# Patient Record
Sex: MALE | Race: OTHER | HISPANIC OR LATINO | URBAN - METROPOLITAN AREA
[De-identification: names, ages, dates, MRNs, and addresses within clinical notes are randomized per-mention and may not be internally consistent; named-entity substitution may affect disease eponyms.]

---

## 2017-12-07 ENCOUNTER — EMERGENCY (EMERGENCY)
Facility: HOSPITAL | Age: 27
LOS: 0 days | Discharge: HOME | End: 2017-12-08
Attending: EMERGENCY MEDICINE | Admitting: EMERGENCY MEDICINE
Payer: COMMERCIAL

## 2017-12-07 VITALS
DIASTOLIC BLOOD PRESSURE: 89 MMHG | WEIGHT: 160.06 LBS | OXYGEN SATURATION: 100 % | HEIGHT: 64 IN | SYSTOLIC BLOOD PRESSURE: 179 MMHG | TEMPERATURE: 98 F | HEART RATE: 110 BPM | RESPIRATION RATE: 18 BRPM

## 2017-12-07 DIAGNOSIS — Z79.899 OTHER LONG TERM (CURRENT) DRUG THERAPY: ICD-10-CM

## 2017-12-07 DIAGNOSIS — F31.9 BIPOLAR DISORDER, UNSPECIFIED: ICD-10-CM

## 2017-12-07 DIAGNOSIS — Z00.8 ENCOUNTER FOR OTHER GENERAL EXAMINATION: ICD-10-CM

## 2017-12-07 DIAGNOSIS — Z91.013 ALLERGY TO SEAFOOD: ICD-10-CM

## 2017-12-07 PROCEDURE — 99285 EMERGENCY DEPT VISIT HI MDM: CPT | Mod: 25

## 2017-12-07 RX ORDER — HALOPERIDOL DECANOATE 100 MG/ML
5 INJECTION INTRAMUSCULAR ONCE
Qty: 0 | Refills: 0 | Status: COMPLETED | OUTPATIENT
Start: 2017-12-07 | End: 2017-12-07

## 2017-12-07 RX ADMIN — Medication 2 MILLIGRAM(S): at 23:28

## 2017-12-07 RX ADMIN — HALOPERIDOL DECANOATE 5 MILLIGRAM(S): 100 INJECTION INTRAMUSCULAR at 23:25

## 2017-12-07 NOTE — ED PROVIDER NOTE - CARE PLAN
Principal Discharge DX:	Bipolar affective disorder, current episode mixed, current episode severity unspecified
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
## 2017-12-07 NOTE — ED PROVIDER NOTE - CONSTITUTIONAL, MLM
normal... Well appearing, well nourished, awake, alert, oriented to person, place.  Tearful, appears anxious, but is cooperative.
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
## 2017-12-07 NOTE — ED PROVIDER NOTE - PROGRESS NOTE DETAILS
JG:  Pt. was unable to sit still and had disorganized thinking.  Manic behavior and meds were given for his bipolar disorder. Sign out from Dr. Michel.  Psych eval pending.  Pt seen and cleared by psych.

## 2017-12-07 NOTE — ED PROVIDER NOTE - FAMILY HISTORY
Father  Still living? Yes, Estimated age: Age Unknown  Family history of bipolar disorder, Age at diagnosis: Age Unknown

## 2017-12-08 VITALS
DIASTOLIC BLOOD PRESSURE: 69 MMHG | RESPIRATION RATE: 16 BRPM | HEART RATE: 50 BPM | OXYGEN SATURATION: 100 % | SYSTOLIC BLOOD PRESSURE: 104 MMHG

## 2017-12-08 DIAGNOSIS — F31.61 BIPOLAR DISORDER, CURRENT EPISODE MIXED, MILD: ICD-10-CM

## 2017-12-08 DIAGNOSIS — F10.10 ALCOHOL ABUSE, UNCOMPLICATED: ICD-10-CM

## 2017-12-08 DIAGNOSIS — F12.10 CANNABIS ABUSE, UNCOMPLICATED: ICD-10-CM

## 2017-12-08 LAB
ALBUMIN SERPL ELPH-MCNC: 3.7 G/DL — SIGNIFICANT CHANGE UP (ref 3.3–5)
ALP SERPL-CCNC: 54 U/L — SIGNIFICANT CHANGE UP (ref 40–120)
ALT FLD-CCNC: 28 U/L — SIGNIFICANT CHANGE UP (ref 12–78)
AMPHET UR-MCNC: NEGATIVE — SIGNIFICANT CHANGE UP
ANION GAP SERPL CALC-SCNC: 7 MMOL/L — SIGNIFICANT CHANGE UP (ref 5–17)
APAP SERPL-MCNC: < 2 UG/ML (ref 10–30)
APPEARANCE UR: CLEAR — SIGNIFICANT CHANGE UP
AST SERPL-CCNC: 24 U/L — SIGNIFICANT CHANGE UP (ref 15–37)
BARBITURATES UR SCN-MCNC: NEGATIVE — SIGNIFICANT CHANGE UP
BASOPHILS # BLD AUTO: 0.1 K/UL — SIGNIFICANT CHANGE UP (ref 0–0.2)
BASOPHILS NFR BLD AUTO: 1.1 % — SIGNIFICANT CHANGE UP (ref 0–2)
BENZODIAZ UR-MCNC: NEGATIVE — SIGNIFICANT CHANGE UP
BILIRUB SERPL-MCNC: 0.8 MG/DL — SIGNIFICANT CHANGE UP (ref 0.2–1.2)
BILIRUB UR-MCNC: NEGATIVE — SIGNIFICANT CHANGE UP
BUN SERPL-MCNC: 16 MG/DL — SIGNIFICANT CHANGE UP (ref 7–23)
CALCIUM SERPL-MCNC: 8.9 MG/DL — SIGNIFICANT CHANGE UP (ref 8.5–10.1)
CHLORIDE SERPL-SCNC: 107 MMOL/L — SIGNIFICANT CHANGE UP (ref 96–108)
CO2 SERPL-SCNC: 27 MMOL/L — SIGNIFICANT CHANGE UP (ref 22–31)
COCAINE METAB.OTHER UR-MCNC: NEGATIVE — SIGNIFICANT CHANGE UP
COLOR SPEC: YELLOW — SIGNIFICANT CHANGE UP
CREAT SERPL-MCNC: 0.96 MG/DL — SIGNIFICANT CHANGE UP (ref 0.5–1.3)
DIFF PNL FLD: NEGATIVE — SIGNIFICANT CHANGE UP
EOSINOPHIL # BLD AUTO: 0.1 K/UL — SIGNIFICANT CHANGE UP (ref 0–0.5)
EOSINOPHIL NFR BLD AUTO: 1.5 % — SIGNIFICANT CHANGE UP (ref 0–6)
ETHANOL SERPL-MCNC: <10 MG/DL — SIGNIFICANT CHANGE UP (ref 0–10)
GLUCOSE SERPL-MCNC: 99 MG/DL — SIGNIFICANT CHANGE UP (ref 70–99)
GLUCOSE UR QL: NEGATIVE MG/DL — SIGNIFICANT CHANGE UP
HCT VFR BLD CALC: 38.6 % — LOW (ref 39–50)
HGB BLD-MCNC: 13.3 G/DL — SIGNIFICANT CHANGE UP (ref 13–17)
KETONES UR-MCNC: NEGATIVE — SIGNIFICANT CHANGE UP
LEUKOCYTE ESTERASE UR-ACNC: NEGATIVE — SIGNIFICANT CHANGE UP
LYMPHOCYTES # BLD AUTO: 2.7 K/UL — SIGNIFICANT CHANGE UP (ref 1–3.3)
LYMPHOCYTES # BLD AUTO: 28.5 % — SIGNIFICANT CHANGE UP (ref 13–44)
MCHC RBC-ENTMCNC: 30.1 PG — SIGNIFICANT CHANGE UP (ref 27–34)
MCHC RBC-ENTMCNC: 34.4 GM/DL — SIGNIFICANT CHANGE UP (ref 32–36)
MCV RBC AUTO: 87.6 FL — SIGNIFICANT CHANGE UP (ref 80–100)
METHADONE UR-MCNC: NEGATIVE — SIGNIFICANT CHANGE UP
MONOCYTES # BLD AUTO: 0.8 K/UL — SIGNIFICANT CHANGE UP (ref 0–0.9)
MONOCYTES NFR BLD AUTO: 8.6 % — SIGNIFICANT CHANGE UP (ref 2–14)
NEUTROPHILS # BLD AUTO: 5.8 K/UL — SIGNIFICANT CHANGE UP (ref 1.8–7.4)
NEUTROPHILS NFR BLD AUTO: 60.4 % — SIGNIFICANT CHANGE UP (ref 43–77)
NITRITE UR-MCNC: NEGATIVE — SIGNIFICANT CHANGE UP
OPIATES UR-MCNC: NEGATIVE — SIGNIFICANT CHANGE UP
PCP SPEC-MCNC: SIGNIFICANT CHANGE UP
PCP UR-MCNC: NEGATIVE — SIGNIFICANT CHANGE UP
PH UR: 6.5 — SIGNIFICANT CHANGE UP (ref 5–8)
PLATELET # BLD AUTO: 322 K/UL — SIGNIFICANT CHANGE UP (ref 150–400)
POTASSIUM SERPL-MCNC: 3.7 MMOL/L — SIGNIFICANT CHANGE UP (ref 3.5–5.3)
POTASSIUM SERPL-SCNC: 3.7 MMOL/L — SIGNIFICANT CHANGE UP (ref 3.5–5.3)
PROT SERPL-MCNC: 6.8 GM/DL — SIGNIFICANT CHANGE UP (ref 6–8.3)
PROT UR-MCNC: NEGATIVE MG/DL — SIGNIFICANT CHANGE UP
RBC # BLD: 4.41 M/UL — SIGNIFICANT CHANGE UP (ref 4.2–5.8)
RBC # FLD: 11.1 % — SIGNIFICANT CHANGE UP (ref 10.3–14.5)
SALICYLATES SERPL-MCNC: <1.7 MG/DL (ref 2.8–20)
SODIUM SERPL-SCNC: 141 MMOL/L — SIGNIFICANT CHANGE UP (ref 135–145)
SP GR SPEC: 1.01 — SIGNIFICANT CHANGE UP (ref 1.01–1.02)
THC UR QL: POSITIVE — SIGNIFICANT CHANGE UP
TSH SERPL-MCNC: 0.37 UIU/ML — SIGNIFICANT CHANGE UP (ref 0.36–3.74)
UROBILINOGEN FLD QL: NEGATIVE MG/DL — SIGNIFICANT CHANGE UP
WBC # BLD: 9.5 K/UL — SIGNIFICANT CHANGE UP (ref 3.8–10.5)
WBC # FLD AUTO: 9.5 K/UL — SIGNIFICANT CHANGE UP (ref 3.8–10.5)

## 2017-12-08 PROCEDURE — 93010 ELECTROCARDIOGRAM REPORT: CPT

## 2017-12-08 NOTE — ED BEHAVIORAL HEALTH ASSESSMENT NOTE - DESCRIPTION
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
## 2017-12-08 NOTE — ED BEHAVIORAL HEALTH ASSESSMENT NOTE - RISK ASSESSMENT
moderate for aggression due to poor insight, substance use, impusivity and disagreements with brother

## 2017-12-08 NOTE — ED BEHAVIORAL HEALTH ASSESSMENT NOTE - SUMMARY
28 yo man with mood symptoms for past 6-8 months apparent bipolar spectrum illness especially in light of family history likely stabilized by Adderall use and withdrawal.  Patient does not meet criteria for involuntary commitment at this time and refuses voluntary admit.    Agreed to referral to [facility] in light of hx of substance use. may also choose to return to [facility] where he was treated previously

## 2017-12-09 ENCOUNTER — EMERGENCY (EMERGENCY)
Facility: HOSPITAL | Age: 27
LOS: 0 days | Discharge: HOME | End: 2017-12-09
Attending: EMERGENCY MEDICINE | Admitting: EMERGENCY MEDICINE
Payer: COMMERCIAL

## 2017-12-09 VITALS
RESPIRATION RATE: 17 BRPM | HEART RATE: 61 BPM | OXYGEN SATURATION: 100 % | DIASTOLIC BLOOD PRESSURE: 92 MMHG | SYSTOLIC BLOOD PRESSURE: 140 MMHG | TEMPERATURE: 97 F

## 2017-12-09 VITALS — WEIGHT: 130.07 LBS | HEIGHT: 67 IN
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
## 2017-12-09 DIAGNOSIS — R68.84 JAW PAIN: ICD-10-CM

## 2017-12-09 DIAGNOSIS — Z79.899 OTHER LONG TERM (CURRENT) DRUG THERAPY: ICD-10-CM

## 2017-12-09 DIAGNOSIS — F10.10 ALCOHOL ABUSE, UNCOMPLICATED: ICD-10-CM
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
## 2017-12-09 DIAGNOSIS — F30.8 OTHER MANIC EPISODES: ICD-10-CM

## 2017-12-09 DIAGNOSIS — Z81.8 FAMILY HISTORY OF OTHER MENTAL AND BEHAVIORAL DISORDERS: ICD-10-CM

## 2017-12-09 DIAGNOSIS — S00.83XA CONTUSION OF OTHER PART OF HEAD, INITIAL ENCOUNTER: ICD-10-CM
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
## 2017-12-09 DIAGNOSIS — F31.0 BIPOLAR DISORDER, CURRENT EPISODE HYPOMANIC: ICD-10-CM

## 2017-12-09 DIAGNOSIS — F12.10 CANNABIS ABUSE, UNCOMPLICATED: ICD-10-CM

## 2017-12-09 LAB
APAP SERPL-MCNC: < 2 UG/ML — SIGNIFICANT CHANGE UP (ref 10–30)
ETHANOL SERPL-MCNC: <10 MG/DL — SIGNIFICANT CHANGE UP (ref 0–10)
SALICYLATES SERPL-MCNC: <1.7 MG/DL — LOW (ref 2.8–20)

## 2017-12-09 PROCEDURE — 90792 PSYCH DIAG EVAL W/MED SRVCS: CPT | Mod: GT

## 2017-12-09 PROCEDURE — 70450 CT HEAD/BRAIN W/O DYE: CPT | Mod: 26

## 2017-12-09 PROCEDURE — 70486 CT MAXILLOFACIAL W/O DYE: CPT | Mod: 26

## 2017-12-09 PROCEDURE — 99285 EMERGENCY DEPT VISIT HI MDM: CPT | Mod: 25

## 2017-12-09 PROCEDURE — 76377 3D RENDER W/INTRP POSTPROCES: CPT | Mod: 26

## 2017-12-09 RX ORDER — CYCLOBENZAPRINE HYDROCHLORIDE 10 MG/1
1 TABLET, FILM COATED ORAL
Qty: 16 | Refills: 0 | OUTPATIENT
Start: 2017-12-09 | End: 2017-12-13

## 2017-12-09 NOTE — ED BEHAVIORAL HEALTH ASSESSMENT NOTE - FAMILY HISTORY OF SUBSTANCE ABUSE
Infusion Nursing Note:  Izabella Og presents today for IVIG.    Patient seen by provider today: No   present during visit today: Not Applicable.    Note: Followed rate increase for [employer] per package insert. Pt request top rate 350 ml/hr.    Intravenous Access:  Peripheral IV placed.    Treatment Conditions:  Not Applicable.      Post Infusion Assessment:  Patient tolerated infusion without incident.  Site patent and intact, free from redness, edema or discomfort.  No evidence of extravasations.  Access discontinued per protocol.    Discharge Plan:   Patient and/or family verbalized understanding of discharge instructions and all questions answered.    Nasima Christensen RN                        
None known
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
## 2017-12-09 NOTE — ED PROVIDER NOTE - MUSCULOSKELETAL, MLM
Spine appears normal, range of motion is not limited, no muscle or joint tenderness.  Neck: NT, AFROM.  Back NT, stable.  Pelvis NT, stable.  COHEN x 4.  Normal gait.
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
## 2017-12-09 NOTE — ED PROVIDER NOTE - CHPI ED SYMPTOMS NEG
no blurred vision/no change in level of consciousness/no chest pain/no abrasion/no back pain/no chest wall tenderness/no loss of consciousness/no weakness/no seizure
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
## 2017-12-09 NOTE — ED BEHAVIORAL HEALTH ASSESSMENT NOTE - DESCRIPTION
27 year old SHM, domiciled with family, recently took a medical leave from work none Calm and cooperative, received valium 5 mg oral in ED, due to anxiety  Vital Signs Last 24 Hrs  T(C): 36.6 (09 Dec 2017 00:20), Max: 36.6 (09 Dec 2017 00:20)  T(F): 97.9 (09 Dec 2017 00:20), Max: 97.9 (09 Dec 2017 00:20)  HR: 74 (09 Dec 2017 00:20) (50 - 74)  BP: 144/90 (09 Dec 2017 00:20) (104/69 - 144/90)  BP(mean): --  RR: 19 (09 Dec 2017 00:20) (16 - 19)  SpO2: 100% (09 Dec 2017 00:20) (100% - 100%)
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
## 2017-12-09 NOTE — ED ADULT NURSE REASSESSMENT NOTE - NS ED NURSE REASSESS COMMENT FT1
Patient exerted bizarre behavior at ED. Restless, pacing, reporting "I am out of my mind", reports feeling anxious. Mother voiced concern over son's behavior stating he stopped taking his meds for Bipolar about 1 week ago. Mother reports his behavior and mentation has been decreasing over the last week. Patient layed on the floor for a few seconds. 1:1 for safety. Pending psych eval. Medicated for pain to jaw as ordered. Will continue monitoring patient.

## 2017-12-09 NOTE — ED PROVIDER NOTE - NEUROLOGICAL, MLM
Alert and oriented, no focal deficits, no motor or sensory deficits.  Normal speech.  CNs II - XII intact.

## 2017-12-09 NOTE — ED BEHAVIORAL HEALTH ASSESSMENT NOTE - SUICIDE PROTECTIVE FACTORS
Responsibility to family and others/Fear of death or dying due to pain/suffering/High spirituality/Supportive social network or family/Identifies reasons for living/Future oriented
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
## 2017-12-09 NOTE — ED BEHAVIORAL HEALTH ASSESSMENT NOTE - SAFETY PLAN DETAILS
patient and mother advised to return to ED or call 911 for any worsening symptoms and patient agreed.

## 2018-02-08 ENCOUNTER — APPOINTMENT (OUTPATIENT)
Dept: NEUROLOGY | Facility: CLINIC | Age: 28
End: 2018-02-08
Payer: COMMERCIAL

## 2018-02-08 VITALS
BODY MASS INDEX: 21.69 KG/M2 | DIASTOLIC BLOOD PRESSURE: 82 MMHG | WEIGHT: 135 LBS | HEART RATE: 40 BPM | SYSTOLIC BLOOD PRESSURE: 152 MMHG | HEIGHT: 66 IN

## 2018-02-08 DIAGNOSIS — Z86.59 PERSONAL HISTORY OF OTHER MENTAL AND BEHAVIORAL DISORDERS: ICD-10-CM

## 2018-02-08 DIAGNOSIS — Z78.9 OTHER SPECIFIED HEALTH STATUS: ICD-10-CM

## 2018-02-08 DIAGNOSIS — F41.9 ANXIETY DISORDER, UNSPECIFIED: ICD-10-CM

## 2018-02-08 PROCEDURE — 99213 OFFICE O/P EST LOW 20 MIN: CPT

## 2018-02-17 ENCOUNTER — EMERGENCY (EMERGENCY)
Facility: HOSPITAL | Age: 28
LOS: 0 days | Discharge: HOME | End: 2018-02-17
Attending: EMERGENCY MEDICINE | Admitting: EMERGENCY MEDICINE
Payer: COMMERCIAL

## 2018-02-17 VITALS
RESPIRATION RATE: 18 BRPM | OXYGEN SATURATION: 100 % | DIASTOLIC BLOOD PRESSURE: 108 MMHG | WEIGHT: 147.05 LBS | HEART RATE: 73 BPM | SYSTOLIC BLOOD PRESSURE: 142 MMHG | TEMPERATURE: 98 F

## 2018-02-17 VITALS
DIASTOLIC BLOOD PRESSURE: 78 MMHG | OXYGEN SATURATION: 100 % | HEART RATE: 68 BPM | SYSTOLIC BLOOD PRESSURE: 135 MMHG | RESPIRATION RATE: 17 BRPM | TEMPERATURE: 98 F

## 2018-02-17 DIAGNOSIS — F31.9 BIPOLAR DISORDER, UNSPECIFIED: ICD-10-CM

## 2018-02-17 DIAGNOSIS — R69 ILLNESS, UNSPECIFIED: ICD-10-CM

## 2018-02-17 LAB
AMPHET UR-MCNC: NEGATIVE — SIGNIFICANT CHANGE UP
ANION GAP SERPL CALC-SCNC: 5 MMOL/L — SIGNIFICANT CHANGE UP (ref 5–17)
APAP SERPL-MCNC: < 2 UG/ML (ref 10–30)
APPEARANCE UR: CLEAR — SIGNIFICANT CHANGE UP
BARBITURATES UR SCN-MCNC: NEGATIVE — SIGNIFICANT CHANGE UP
BASOPHILS # BLD AUTO: 0.1 K/UL — SIGNIFICANT CHANGE UP (ref 0–0.2)
BASOPHILS NFR BLD AUTO: 1 % — SIGNIFICANT CHANGE UP (ref 0–2)
BENZODIAZ UR-MCNC: NEGATIVE — SIGNIFICANT CHANGE UP
BILIRUB UR-MCNC: NEGATIVE — SIGNIFICANT CHANGE UP
BUN SERPL-MCNC: 9 MG/DL — SIGNIFICANT CHANGE UP (ref 7–23)
CALCIUM SERPL-MCNC: 8.9 MG/DL — SIGNIFICANT CHANGE UP (ref 8.5–10.1)
CHLORIDE SERPL-SCNC: 108 MMOL/L — SIGNIFICANT CHANGE UP (ref 96–108)
CO2 SERPL-SCNC: 28 MMOL/L — SIGNIFICANT CHANGE UP (ref 22–31)
COCAINE METAB.OTHER UR-MCNC: NEGATIVE — SIGNIFICANT CHANGE UP
COLOR SPEC: YELLOW — SIGNIFICANT CHANGE UP
CREAT SERPL-MCNC: 0.83 MG/DL — SIGNIFICANT CHANGE UP (ref 0.5–1.3)
DIFF PNL FLD: NEGATIVE — SIGNIFICANT CHANGE UP
EOSINOPHIL # BLD AUTO: 0.2 K/UL — SIGNIFICANT CHANGE UP (ref 0–0.5)
EOSINOPHIL NFR BLD AUTO: 1.6 % — SIGNIFICANT CHANGE UP (ref 0–6)
ETHANOL SERPL-MCNC: <10 MG/DL — SIGNIFICANT CHANGE UP (ref 0–10)
GLUCOSE SERPL-MCNC: 94 MG/DL — SIGNIFICANT CHANGE UP (ref 70–99)
GLUCOSE UR QL: NEGATIVE MG/DL — SIGNIFICANT CHANGE UP
HCT VFR BLD CALC: 42.8 % — SIGNIFICANT CHANGE UP (ref 39–50)
HGB BLD-MCNC: 14.5 G/DL — SIGNIFICANT CHANGE UP (ref 13–17)
KETONES UR-MCNC: NEGATIVE — SIGNIFICANT CHANGE UP
LEUKOCYTE ESTERASE UR-ACNC: NEGATIVE — SIGNIFICANT CHANGE UP
LYMPHOCYTES # BLD AUTO: 2.6 K/UL — SIGNIFICANT CHANGE UP (ref 1–3.3)
LYMPHOCYTES # BLD AUTO: 24.1 % — SIGNIFICANT CHANGE UP (ref 13–44)
MCHC RBC-ENTMCNC: 29.9 PG — SIGNIFICANT CHANGE UP (ref 27–34)
MCHC RBC-ENTMCNC: 33.8 GM/DL — SIGNIFICANT CHANGE UP (ref 32–36)
MCV RBC AUTO: 88.4 FL — SIGNIFICANT CHANGE UP (ref 80–100)
METHADONE UR-MCNC: NEGATIVE — SIGNIFICANT CHANGE UP
MONOCYTES # BLD AUTO: 0.6 K/UL — SIGNIFICANT CHANGE UP (ref 0–0.9)
MONOCYTES NFR BLD AUTO: 5.6 % — SIGNIFICANT CHANGE UP (ref 2–14)
NEUTROPHILS # BLD AUTO: 7.3 K/UL — SIGNIFICANT CHANGE UP (ref 1.8–7.4)
NEUTROPHILS NFR BLD AUTO: 67.8 % — SIGNIFICANT CHANGE UP (ref 43–77)
NITRITE UR-MCNC: NEGATIVE — SIGNIFICANT CHANGE UP
OPIATES UR-MCNC: NEGATIVE — SIGNIFICANT CHANGE UP
PCP SPEC-MCNC: SIGNIFICANT CHANGE UP
PCP UR-MCNC: NEGATIVE — SIGNIFICANT CHANGE UP
PH UR: 7 — SIGNIFICANT CHANGE UP (ref 5–8)
PLATELET # BLD AUTO: 291 K/UL — SIGNIFICANT CHANGE UP (ref 150–400)
POTASSIUM SERPL-MCNC: 4.2 MMOL/L — SIGNIFICANT CHANGE UP (ref 3.5–5.3)
POTASSIUM SERPL-SCNC: 4.2 MMOL/L — SIGNIFICANT CHANGE UP (ref 3.5–5.3)
PROT UR-MCNC: NEGATIVE MG/DL — SIGNIFICANT CHANGE UP
RBC # BLD: 4.84 M/UL — SIGNIFICANT CHANGE UP (ref 4.2–5.8)
RBC # FLD: 12 % — SIGNIFICANT CHANGE UP (ref 10.3–14.5)
SALICYLATES SERPL-MCNC: <1.7 MG/DL — LOW (ref 2.8–20)
SODIUM SERPL-SCNC: 141 MMOL/L — SIGNIFICANT CHANGE UP (ref 135–145)
SP GR SPEC: 1 — LOW (ref 1.01–1.02)
THC UR QL: POSITIVE — SIGNIFICANT CHANGE UP
TSH SERPL-MCNC: 1.6 UIU/ML — SIGNIFICANT CHANGE UP (ref 0.36–3.74)
UROBILINOGEN FLD QL: NEGATIVE MG/DL — SIGNIFICANT CHANGE UP
WBC # BLD: 10.8 K/UL — HIGH (ref 3.8–10.5)
WBC # FLD AUTO: 10.8 K/UL — HIGH (ref 3.8–10.5)

## 2018-02-17 PROCEDURE — 93010 ELECTROCARDIOGRAM REPORT: CPT

## 2018-02-17 PROCEDURE — 99284 EMERGENCY DEPT VISIT MOD MDM: CPT

## 2018-02-17 NOTE — ED PROVIDER NOTE - PLAN OF CARE
Continue to take your medications. Follow up with your primary doctor and with your psychiatrist. Return to the Emergency Dept if you develop any new or worsening symptoms
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
## 2018-02-17 NOTE — ED BEHAVIORAL HEALTH ASSESSMENT NOTE - SUMMARY
Patient is a 27 year old single male with a history of ADHD treatment and bipolar disorder.  Patient was in the ED in Dec. 2017 with similar episode of agitation. He uses THC daily. He admits to mild mood swings and anger outbursts, and continues to refuse medication.  He is in therapy and willing to increase it to 2x week.  He is under the care of Dr. Saenz for neurology issues, and is willing to consider lithium. Patient denies SI/HI, no delusions or hallucinations.

## 2018-02-17 NOTE — ED ADULT NURSE NOTE - OBJECTIVE STATEMENT
26 y/o M BIB PD from home for agitation. Pt denies SI, HI at this time but does state " I am nervous if I'm really in control anymore." As per pt, there was a recent diagnosis of bipolar by a therapist, denies being on any medication at this time. Pt states he was previously on Adderall but is no longer taking that medication. Pt states his father and brother have a hx of bipolar. Pt states that he is the oldest sibling and does not feel he is meeting the expectation of being the eldest. Pt states he feels his family is "tiptoeing around him." Pt states today he had a "break." Pt calm and cooperative during evaluation, 1:1 maintained for safety.

## 2018-02-17 NOTE — ED BEHAVIORAL HEALTH ASSESSMENT NOTE - DESCRIPTION
Patient was agitated on arrival but calmed down with support from the staff and his mother. none Single male lives with family, from [facility], was working 4 year for [employer]

## 2018-02-17 NOTE — ED ADULT NURSE NOTE - CHIEF COMPLAINT QUOTE
Pt brought in by ANIL ([id number]) after becoming agitated with family at home.  Pt denies SI, HI, but states, "I was sort of upset before and I didn't want to hurt my sister, instead I..."  Pt denies self-harm.  Cooperative at triage desk.  Recent DX bipolar.

## 2018-02-17 NOTE — ED PROVIDER NOTE - CARE PLAN
Principal Discharge DX:	Bipolar affective disorder, current episode manic, current episode severity unspecified  Assessment and plan of treatment:	Continue to take your medications. Follow up with your primary doctor and with your psychiatrist. Return to the Emergency Dept if you develop any new or worsening symptoms

## 2018-02-17 NOTE — ED PROVIDER NOTE - PROGRESS NOTE DETAILS
Pt signed out to me pending psychiatry eval. Pt seen, evaluated and cleared by psychiatry for outpatient follow up. Patient comfortable with discharge plan, understands return instructions.
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
## 2018-02-17 NOTE — ED ADULT TRIAGE NOTE - CHIEF COMPLAINT QUOTE
Pt brought in by ANIL ([id number]) after becoming agitated with family at home.  Pt denies SI, HI, but states, "I was sort of upset before and I didn't want to hurt my sister, instead I..."  Pt denies self-harm.  Cooperative at triage desk.  Recent DX bipolar.

## 2018-02-17 NOTE — ED BEHAVIORAL HEALTH ASSESSMENT NOTE - OTHER PAST PSYCHIATRIC HISTORY (INCLUDE DETAILS REGARDING ONSET, COURSE OF ILLNESS, INPATIENT/OUTPATIENT TREATMENT)
tried zoloft for a short while but stopped it, has a therapist 2 x week, ED visits here at , no admissions.

## 2018-03-15 ENCOUNTER — INPATIENT (INPATIENT)
Facility: HOSPITAL | Age: 28
LOS: 4 days | Discharge: HOME | End: 2018-03-20
Attending: PSYCHIATRY & NEUROLOGY | Admitting: PSYCHIATRY & NEUROLOGY
Payer: COMMERCIAL

## 2018-03-15 VITALS — WEIGHT: 134.92 LBS

## 2018-03-15 DIAGNOSIS — F39 UNSPECIFIED MOOD [AFFECTIVE] DISORDER: ICD-10-CM

## 2018-03-15 DIAGNOSIS — R69 ILLNESS, UNSPECIFIED: ICD-10-CM

## 2018-03-15 LAB
ALBUMIN SERPL ELPH-MCNC: 4 G/DL — SIGNIFICANT CHANGE UP (ref 3.3–5)
ALP SERPL-CCNC: 46 U/L — SIGNIFICANT CHANGE UP (ref 40–120)
ALT FLD-CCNC: 27 U/L — SIGNIFICANT CHANGE UP (ref 12–78)
AMPHET UR-MCNC: NEGATIVE — SIGNIFICANT CHANGE UP
ANION GAP SERPL CALC-SCNC: 4 MMOL/L — LOW (ref 5–17)
APAP SERPL-MCNC: < 2 UG/ML (ref 10–30)
APPEARANCE UR: CLEAR — SIGNIFICANT CHANGE UP
AST SERPL-CCNC: 25 U/L — SIGNIFICANT CHANGE UP (ref 15–37)
BARBITURATES UR SCN-MCNC: NEGATIVE — SIGNIFICANT CHANGE UP
BASOPHILS # BLD AUTO: 0.05 K/UL — SIGNIFICANT CHANGE UP (ref 0–0.2)
BASOPHILS NFR BLD AUTO: 0.4 % — SIGNIFICANT CHANGE UP (ref 0–2)
BENZODIAZ UR-MCNC: NEGATIVE — SIGNIFICANT CHANGE UP
BILIRUB SERPL-MCNC: 1.1 MG/DL — SIGNIFICANT CHANGE UP (ref 0.2–1.2)
BILIRUB UR-MCNC: NEGATIVE — SIGNIFICANT CHANGE UP
BUN SERPL-MCNC: 17 MG/DL — SIGNIFICANT CHANGE UP (ref 7–23)
CALCIUM SERPL-MCNC: 8.7 MG/DL — SIGNIFICANT CHANGE UP (ref 8.5–10.1)
CHLORIDE SERPL-SCNC: 108 MMOL/L — SIGNIFICANT CHANGE UP (ref 96–108)
CO2 SERPL-SCNC: 25 MMOL/L — SIGNIFICANT CHANGE UP (ref 22–31)
COCAINE METAB.OTHER UR-MCNC: NEGATIVE — SIGNIFICANT CHANGE UP
COLOR SPEC: YELLOW — SIGNIFICANT CHANGE UP
CREAT SERPL-MCNC: 1.1 MG/DL — SIGNIFICANT CHANGE UP (ref 0.5–1.3)
DIFF PNL FLD: NEGATIVE — SIGNIFICANT CHANGE UP
EOSINOPHIL # BLD AUTO: 0.02 K/UL — SIGNIFICANT CHANGE UP (ref 0–0.5)
EOSINOPHIL NFR BLD AUTO: 0.2 % — SIGNIFICANT CHANGE UP (ref 0–6)
ETHANOL SERPL-MCNC: <10 MG/DL — SIGNIFICANT CHANGE UP (ref 0–10)
GLUCOSE SERPL-MCNC: 113 MG/DL — HIGH (ref 70–99)
GLUCOSE UR QL: NEGATIVE MG/DL — SIGNIFICANT CHANGE UP
HCT VFR BLD CALC: 38.6 % — LOW (ref 39–50)
HGB BLD-MCNC: 13.7 G/DL — SIGNIFICANT CHANGE UP (ref 13–17)
IMM GRANULOCYTES NFR BLD AUTO: 0.4 % — SIGNIFICANT CHANGE UP (ref 0–1.5)
KETONES UR-MCNC: (no result)
LEUKOCYTE ESTERASE UR-ACNC: NEGATIVE — SIGNIFICANT CHANGE UP
LYMPHOCYTES # BLD AUTO: 16.3 % — SIGNIFICANT CHANGE UP (ref 13–44)
LYMPHOCYTES # BLD AUTO: 2.16 K/UL — SIGNIFICANT CHANGE UP (ref 1–3.3)
MCHC RBC-ENTMCNC: 30.6 PG — SIGNIFICANT CHANGE UP (ref 27–34)
MCHC RBC-ENTMCNC: 35.5 GM/DL — SIGNIFICANT CHANGE UP (ref 32–36)
MCV RBC AUTO: 86.2 FL — SIGNIFICANT CHANGE UP (ref 80–100)
METHADONE UR-MCNC: NEGATIVE — SIGNIFICANT CHANGE UP
MONOCYTES # BLD AUTO: 0.68 K/UL — SIGNIFICANT CHANGE UP (ref 0–0.9)
MONOCYTES NFR BLD AUTO: 5.1 % — SIGNIFICANT CHANGE UP (ref 2–14)
NEUTROPHILS # BLD AUTO: 10.31 K/UL — HIGH (ref 1.8–7.4)
NEUTROPHILS NFR BLD AUTO: 77.6 % — HIGH (ref 43–77)
NITRITE UR-MCNC: NEGATIVE — SIGNIFICANT CHANGE UP
NRBC # BLD: 0 /100 WBCS — SIGNIFICANT CHANGE UP (ref 0–0)
OPIATES UR-MCNC: NEGATIVE — SIGNIFICANT CHANGE UP
PCP SPEC-MCNC: SIGNIFICANT CHANGE UP
PCP UR-MCNC: NEGATIVE — SIGNIFICANT CHANGE UP
PH UR: 6.5 — SIGNIFICANT CHANGE UP (ref 5–8)
PLATELET # BLD AUTO: 285 K/UL — SIGNIFICANT CHANGE UP (ref 150–400)
POTASSIUM SERPL-MCNC: 3.6 MMOL/L — SIGNIFICANT CHANGE UP (ref 3.5–5.3)
POTASSIUM SERPL-SCNC: 3.6 MMOL/L — SIGNIFICANT CHANGE UP (ref 3.5–5.3)
PROT SERPL-MCNC: 7.1 GM/DL — SIGNIFICANT CHANGE UP (ref 6–8.3)
PROT UR-MCNC: NEGATIVE MG/DL — SIGNIFICANT CHANGE UP
RBC # BLD: 4.48 M/UL — SIGNIFICANT CHANGE UP (ref 4.2–5.8)
RBC # FLD: 12.4 % — SIGNIFICANT CHANGE UP (ref 10.3–14.5)
SALICYLATES SERPL-MCNC: <1.7 MG/DL — LOW (ref 2.8–20)
SODIUM SERPL-SCNC: 137 MMOL/L — SIGNIFICANT CHANGE UP (ref 135–145)
SP GR SPEC: 1.01 — SIGNIFICANT CHANGE UP (ref 1.01–1.02)
THC UR QL: POSITIVE — SIGNIFICANT CHANGE UP
UROBILINOGEN FLD QL: NEGATIVE MG/DL — SIGNIFICANT CHANGE UP
WBC # BLD: 13.27 K/UL — HIGH (ref 3.8–10.5)
WBC # FLD AUTO: 13.27 K/UL — HIGH (ref 3.8–10.5)

## 2018-03-15 PROCEDURE — 99285 EMERGENCY DEPT VISIT HI MDM: CPT

## 2018-03-15 PROCEDURE — 93010 ELECTROCARDIOGRAM REPORT: CPT

## 2018-03-15 RX ORDER — HALOPERIDOL DECANOATE 100 MG/ML
5 INJECTION INTRAMUSCULAR ONCE
Qty: 0 | Refills: 0 | Status: DISCONTINUED | OUTPATIENT
Start: 2018-03-15 | End: 2018-03-20

## 2018-03-15 RX ORDER — TRAZODONE HCL 50 MG
50 TABLET ORAL AT BEDTIME
Qty: 0 | Refills: 0 | Status: DISCONTINUED | OUTPATIENT
Start: 2018-03-15 | End: 2018-03-18

## 2018-03-15 RX ORDER — DIPHENHYDRAMINE HCL 50 MG
50 CAPSULE ORAL ONCE
Qty: 0 | Refills: 0 | Status: DISCONTINUED | OUTPATIENT
Start: 2018-03-15 | End: 2018-03-20

## 2018-03-15 RX ADMIN — Medication 50 MILLIGRAM(S): at 21:13
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
## 2018-03-15 NOTE — ED ADULT NURSE NOTE - CHPI ED SYMPTOMS NEG
no hallucinations/no confusion/no change in level of consciousness/no disorientation/no weight loss/no agitation/no homicidal/no weakness/no paranoia

## 2018-03-15 NOTE — ED BEHAVIORAL HEALTH ASSESSMENT NOTE - HPI (INCLUDE ILLNESS QUALITY, SEVERITY, DURATION, TIMING, CONTEXT, MODIFYING FACTORS, ASSOCIATED SIGNS AND SYMPTOMS)
27 yohm, lives with family, works in sanitation, PPH depression, ADHD r/o Bipolar, Alcohol abuse in full remission, many years, Marijuana use in remission x1 month, multiple ED psych consults for depression and Mood dysregulation, no prior psych admissions, SA, self harm, or violence, PMH 15 lb weight loss secondary to Adderall rx, which pt stopped in Sept due to SE, and is allergic to fish, bib self for psych eval due to worsening depression.  Pt reports chronic progressing depression with increased symptoms of hopelessness, worthlessness, and SI for past 2 days.  Pt took recent Bentely for 3 months in lieu of resigning from San[employer], after he was encouraged to seek time off instead as employer did not want to lose Pt.  Pt has attempted treatment in past including Zoloft but stopped in 2 mo due to ineffectiveness.  Dr Saenz diagnosed with ADHD and was prescribed Adderall which was initially helpful, was able to focus and do a lot of reading, however stopped due to side effects and weight loss.  Pt reports increasing agitation, depressed mood, rumination, low enery and irritability with other and reports social anxiety at work.  Pt reports sleep is good, but broken and wakes during the night.  Pt is a Pt at [facility] for group therapy, but due to  pos urine tox for THC was advised to seek help elsewhere until he was in remission from drug use.  Pt was planning to return next month as he has stopped using MJ but depression and anxiety has increased and came to hospital for help including pos admission to 5N.   Pt reports past 2 days of SI with plan to hang self or cut his wrist.  Pt denies intent because of family.  In past ED evals, Pt has been irritable aggressive at home banging table, when he felt misunderstood by family.  Pt admits to limitations in frustration tolerance.  Pt has attempted other modalities for relaxation ie Yoga and is certified, however since depression and anxiety recurrence, so has social intolerance and once again is thinking of leaving his job, which he normally enjoys.

## 2018-03-15 NOTE — H&P ADULT - HISTORY OF PRESENT ILLNESS
27 yr old male w hx, ADHD, depression who presented to  ED for depression.  He expressed +SI w plan to use knives or hang himself.  No hx attempts.  No drug or alcohol use.  Admitted voluntarily to 5[facility] for depression.       Patient feels better being on 5N than he felt outside.  Reports that he took a leave of absence from [employer] for a few months.  Had been following up w PCP.  After having influenza, he stopped taking his adderal.  Felt better and then felt his mood deteriorate.  Has no specific complaints at present    Past Med Hx   1. Borderline BP  2. Tinea crura, taking prn antifungals topically    Past Surg Hx denied    Fam hx not obtained

## 2018-03-15 NOTE — ED ADULT TRIAGE NOTE - CHIEF COMPLAINT QUOTE
pt c/o feeling worrthless and having suidical throughts for the apst few days, pt denies attempt, denies HI,

## 2018-03-15 NOTE — H&P ADULT - NSHPPHYSICALEXAM_GEN_ALL_CORE
Vital Signs Last 24 Hrs  T(C): 37.2 (15 Mar 2018 17:25), Max: 37.2 (15 Mar 2018 17:25)  T(F): 98.9 (15 Mar 2018 17:25), Max: 98.9 (15 Mar 2018 17:25)  HR: 80 (15 Mar 2018 16:55) (80 - 81)  BP: 125/75 (15 Mar 2018 16:55) (125/75 - 132/83)  RR: 16 (15 Mar 2018 17:25) (16 - 17)  SpO2: 100% (15 Mar 2018 17:25) (99% - 100%)    Pleasant 27 yr old male in NAD  Neuro alert, oriented x 3 normal speech, nl gait and station, asthenic habitus  HEENT pupils reactive anicteric sclera, nl mucosa Bilat ear canals w scant wax, unable to visualize drums due to otoscope fx  L preauricular cystic lesion that is pale and compressible  Neck nonpalpable thyroid, shotty ant cerv LN, mobile  Chest clear breath sounds  Chest wall motion symmetric   Cor RR S1 + S2 no murmurs  Abd + bowel sounds, soft, nontender, no HSM to palpation or to percussion  Extrem no edema  Vasc 1+ /= DP and PT bilaterally  MSK nontender, flexible hip joints  Skin warm and dry + older tatoos  Psych well groomed, good eye contact, pleasant and cooperative

## 2018-03-15 NOTE — PATIENT PROFILE BEHAVIORAL HEALTH - NS TRANSFER PATIENT BELONGINGS
Cell Phone/PDA (specify)/Wrist Watch/Electronic Device (specify)/money clip, 5.00 cash, smartwatch, cellphone

## 2018-03-15 NOTE — H&P ADULT - NSHPREVIEWOFSYSTEMS_GEN_ALL_CORE
1) had 15 lb weight loss while on adderal  no problem w fever or chills, difficulty eating, N, V    2) L earache, no recent fever, difficulty

## 2018-03-15 NOTE — ED BEHAVIORAL HEALTH ASSESSMENT NOTE - SUICIDE PROTECTIVE FACTORS
Engaged in work or school/Positive therapeutic relationships/Supportive social network or family/Responsibility to family and others

## 2018-03-15 NOTE — ED ADULT NURSE NOTE - OBJECTIVE STATEMENT
Pt is a 27y male, Pt is a 27y male, A & O x 3, VSS, presents to ED w/ suicidal thoughts, states he does not feel well, feels depressed for past few weeks, pt has thoughts of harming himself by hanging or cutting his wrists. Denies suicide attempt, denies chest pain, SOB, nausea, vomiting or diarrhea. Does not take medication at this time, Pt is a 27y male, A & O x 3, VSS, presents to ED w/ suicidal thoughts, states he does not feel well, feels depressed for past few weeks, pt has thoughts of harming himself by hanging or cutting his wrists. Denies suicide attempt, denies chest pain, SOB, nausea, vomiting or diarrhea. Does not take medication at this time, states hx of Bi Polar, denies ETOH or drug abuse, pt in no apparent distress, bed rails up, 1:1 at bedside, pt wanded, all belongings placed with security. Will continue to monitor.
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
## 2018-03-15 NOTE — H&P ADULT - NSHPSOCIALHISTORY_GEN_ALL_CORE
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

## 2018-03-15 NOTE — ED BEHAVIORAL HEALTH ASSESSMENT NOTE - SUMMARY
Patient is a 27 year old single male with a history of ADHD treatment and bipolar disorder and alcohol and MJ use disorders in remission(MJ remission x 1 mo).  Patient was in the ED multiple times in Dec. 2017  with  episode of agitation.  He reports progressive worsening depression,  with past 2 days of SI with pan to hang self or cuts writs but denies intent.  Pt is currently off all meds due to side effects ans is looking for a psychiatrist for medication, however due to acuity of depression felt he should be seek in ED for poss admission.  Pt was offered and agreed to psych admission to  on Voluntary status for treatment of mood dysregulation and safety.  Case reviewed with Dr Danielle.

## 2018-03-15 NOTE — ED BEHAVIORAL HEALTH ASSESSMENT NOTE - RISK ASSESSMENT
low risk of self harm, denies intent, protective factors intact, responsibility to family, is working is insightful and willing to follow recommendations

## 2018-03-15 NOTE — ED ADULT NURSE NOTE - CHIEF COMPLAINT QUOTE
pt c/o feeling worthless and having suicidal thoughts for the past few days, pt denies attempt, denies HI,

## 2018-03-15 NOTE — ED PROVIDER NOTE - OBJECTIVE STATEMENT
26 y/o M w/ pmhx of ADHD, presents to ED c/o suicidal thoughts, states "not feeling well" in last few days, reports feelings of depression building up from last few weeks. C/o feelings of worthlessness. +thoughts to hurt himself or commit suicide. Thought of hanging himself or cutting his wrists, +access to knives. States these are only thoughts, no attempts. Not currently on an medications. Denies any attempts, HI or hallucinations. No drug use or alcohol use. Pt used to take Zoloft. Pt recently returned to work in [employer] after taking 3 months off.

## 2018-03-15 NOTE — ED BEHAVIORAL HEALTH ASSESSMENT NOTE - OTHER PAST PSYCHIATRIC HISTORY (INCLUDE DETAILS REGARDING ONSET, COURSE OF ILLNESS, INPATIENT/OUTPATIENT TREATMENT)
Multiple ED evals Gabe Dias in [place] with group therapy.  Prescribed Zoloft in past cannot remember who prescribed.

## 2018-03-15 NOTE — ED BEHAVIORAL HEALTH ASSESSMENT NOTE - PATIENT'S CHIEF COMPLAINT
"I am not feeling well, My mood is low, I feel worthless, can't see my purpose, I have been having thoughts of suicide for 2 days... cutting my wrists or hanging, but I would never do it.".

## 2018-03-15 NOTE — ED STATDOCS - PROGRESS NOTE DETAILS
Ho Corbin on behalf of Attending Dr. Martinez. 28 y/o M with PMHx of bipolar disorder presents to the ED c/o depression and suicidal thoughts. Denies any attempts, HI or hallucinations. Pt reports feelings of worthlessness. Pt does not sees a psychiatrist. Pt has a hx of ADHD. No drug use or alcohol use. Pt used to take Zoloft. Pt recently returned to work in [employer] after taking 3 months off. Pt came to the ED alone. Pt will be sent to the Main ED for further evaluation.

## 2018-03-15 NOTE — ED BEHAVIORAL HEALTH ASSESSMENT NOTE - DESCRIPTION
Pt is cooperative, good eye contact, well engaged, anxious, depressed, restless endorsing severe depressed mood, ruminating feeling worthless, anhedonia, hopeless, amotivation anergia, SI with plan, denies HI/AH/delusions, none evident, no evidence of psychosis or ortega. none Single male lives [place] family, [place],  father from  and mother from [facility], was working 4 year for [employer]

## 2018-03-15 NOTE — H&P ADULT - ASSESSMENT
27 yr old male voluntarily admitted to 5N for depression    1) Depression  1. management as per psych  2. did encourage his yoga practice to facilitate his healing  3. TSH     2) Abnormal labs  A) Leukocytosis  unclear etiology  1. would consider repeat in a few days  B) mild glucose elevation  1. Hb A1c pending    3) Borderline BP/HTN  Pt reports he is eating healthier recently  1. monitor for now  2. good diet and routine excercise would benefit him the most at present  3. depending on lipid profile, may need to change diet to DASH    4) L earache  Exam limited by poor lighting from otoscope but canal appears normal  1. consider repeat evaluation if c/o continues    5) L preauricular cyst  small, compressible/ reported it had been bilateral but R one disappeared  1. outpt follow up if problematic

## 2018-03-15 NOTE — PATIENT PROFILE BEHAVIORAL HEALTH - FUNCTIONAL SCREEN CURRENT LEVEL: TOILETING, MLM
"Chief Complaint   Patient presents with     Well Child     13 years       Initial /62  Pulse 72  Temp 97.1  F (36.2  C) (Oral)  Ht 4' 10\" (1.473 m)  Wt 76 lb (34.5 kg)  SpO2 98%  BMI 15.88 kg/m2 Estimated body mass index is 15.88 kg/(m^2) as calculated from the following:    Height as of this encounter: 4' 10\" (1.473 m).    Weight as of this encounter: 76 lb (34.5 kg).  Medication Reconciliation: shankar Alexander CMA      "
(0) independent

## 2018-03-16 DIAGNOSIS — F12.20 CANNABIS DEPENDENCE, UNCOMPLICATED: ICD-10-CM

## 2018-03-16 DIAGNOSIS — F10.99 ALCOHOL USE, UNSPECIFIED WITH UNSPECIFIED ALCOHOL-INDUCED DISORDER: ICD-10-CM

## 2018-03-16 LAB
CHOLEST SERPL-MCNC: 146 MG/DL — SIGNIFICANT CHANGE UP (ref 10–199)
HBA1C BLD-MCNC: 5.5 % — SIGNIFICANT CHANGE UP (ref 4–5.6)
HDLC SERPL-MCNC: 52 MG/DL — SIGNIFICANT CHANGE UP (ref 40–125)
LIPID PNL WITH DIRECT LDL SERPL: 85 MG/DL — SIGNIFICANT CHANGE UP
TOTAL CHOLESTEROL/HDL RATIO MEASUREMENT: 2.8 RATIO — LOW (ref 3.4–9.6)
TRIGL SERPL-MCNC: 46 MG/DL — SIGNIFICANT CHANGE UP (ref 10–149)
TSH SERPL-MCNC: 1.06 UIU/ML — SIGNIFICANT CHANGE UP (ref 0.36–3.74)

## 2018-03-16 RX ORDER — BUPROPION HYDROCHLORIDE 150 MG/1
150 TABLET, EXTENDED RELEASE ORAL DAILY
Qty: 0 | Refills: 0 | Status: DISCONTINUED | OUTPATIENT
Start: 2018-03-16 | End: 2018-03-18

## 2018-03-16 RX ADMIN — BUPROPION HYDROCHLORIDE 150 MILLIGRAM(S): 150 TABLET, EXTENDED RELEASE ORAL at 10:55

## 2018-03-16 NOTE — PROGRESS NOTE BEHAVIORAL HEALTH - NSBHFUPSTRENGTHS_PSY_A_CORE
In good physical health/Has access to housing/residential stability/Able to exercise self-direction/Steady employment/Has supportive interpersonal relationships with family, friends or peers/Has vocational interests or hobbies/Motivated and ready for change/Attempting to realize his/her potential/Awareness of substance use issues/Intelligent

## 2018-03-17 RX ADMIN — Medication 50 MILLIGRAM(S): at 21:04

## 2018-03-17 RX ADMIN — BUPROPION HYDROCHLORIDE 150 MILLIGRAM(S): 150 TABLET, EXTENDED RELEASE ORAL at 09:13

## 2018-03-17 NOTE — PROGRESS NOTE BEHAVIORAL HEALTH - NSBHCHARTREVIEWLAB_PSY_A_CORE FT
Hemoglobin A1C, Whole Blood: 5.5: Method: Immunoassay       Reference Range                4.0-5.6%       High risk (prediabetic)        5.7-6.4%       Diabetic, diagnostic             >=6.5%       ADA diabetic treatment goal       <7.0%  The Hemoglobin A1c testing is NGSP-certified.Reference ranges are based  upon the 2010 recommendations of  the American Diabetes Association.  Interpretation may vary for children  and adolescents. % (03.16.18 @ 07:06)    Lipid Profile (03.16.18 @ 07:06)    Total Cholesterol/HDL Ratio Measurement: 2.8 RATIO    Cholesterol, Serum: 146 mg/dL    Triglycerides, Serum: 46 mg/dL    HDL Cholesterol, Serum: 52 mg/dL    Direct LDL: 85: LDL Cholesterol --- Interpretive Comment (for adults 18 and over)  Optimal LDL Level may vary based on clinical situation  Below 70                  Ideal for people at very high risk of heart  disease  Below 100                Ideal for people at risk of heart disease  100 - 129                   [place]  130 - 159                   Borderline high  160 - 189                   High  190 and Above          Very high mg/dL
Hemoglobin A1C, Whole Blood (03.16.18 @ 07:06)    Hemoglobin A1C, Whole Blood: 5.5: Method: Immunoassay       Reference Range                4.0-5.6%       High risk (prediabetic)        5.7-6.4%       Diabetic, diagnostic             >=6.5%       ADA diabetic treatment goal       <7.0%  The Hemoglobin A1c testing is NGSP-certified.Reference ranges are based  upon the 2010 recommendations of  the American Diabetes Association.  Interpretation may vary for children  and adolescents. %  Lipid Profile (03.16.18 @ 07:06)    Total Cholesterol/HDL Ratio Measurement: 2.8 RATIO    Cholesterol, Serum: 146 mg/dL    Triglycerides, Serum: 46 mg/dL    HDL Cholesterol, Serum: 52 mg/dL    Direct LDL: 85: LDL Cholesterol --- Interpretive Comment (for adults 18 and over)  Optimal LDL Level may vary based on clinical situation  Below 70                  Ideal for people at very high risk of heart  disease  Below 100                Ideal for people at risk of heart disease  100 - 129                   [place]  130 - 159                   Borderline high  160 - 189                   High  190 and Above          Very high mg/dL

## 2018-03-18 RX ORDER — BUPROPION HYDROCHLORIDE 150 MG/1
300 TABLET, EXTENDED RELEASE ORAL DAILY
Qty: 0 | Refills: 0 | Status: DISCONTINUED | OUTPATIENT
Start: 2018-03-19 | End: 2018-03-20

## 2018-03-18 RX ORDER — TRAZODONE HCL 50 MG
50 TABLET ORAL AT BEDTIME
Qty: 0 | Refills: 0 | Status: DISCONTINUED | OUTPATIENT
Start: 2018-03-18 | End: 2018-03-20

## 2018-03-18 RX ADMIN — BUPROPION HYDROCHLORIDE 150 MILLIGRAM(S): 150 TABLET, EXTENDED RELEASE ORAL at 10:48

## 2018-03-19 RX ORDER — BUPROPION HYDROCHLORIDE 150 MG/1
1 TABLET, EXTENDED RELEASE ORAL
Qty: 14 | Refills: 1 | OUTPATIENT
Start: 2018-03-19 | End: 2018-04-15

## 2018-03-19 RX ORDER — TRAZODONE HCL 50 MG
1 TABLET ORAL
Qty: 14 | Refills: 1
Start: 2018-03-19 | End: 2018-04-15

## 2018-03-19 RX ORDER — TRAZODONE HCL 50 MG
1 TABLET ORAL
Qty: 14 | Refills: 1 | OUTPATIENT
Start: 2018-03-19 | End: 2018-04-15

## 2018-03-19 RX ADMIN — BUPROPION HYDROCHLORIDE 300 MILLIGRAM(S): 150 TABLET, EXTENDED RELEASE ORAL at 09:05

## 2018-03-19 RX ADMIN — Medication 50 MILLIGRAM(S): at 20:53

## 2018-03-19 NOTE — DISCHARGE NOTE BEHAVIORAL HEALTH - NSBHDCCRISISPLAN1FT_PSY_A_CORE
Call my MD, my therapist, go to the nearest emergency room, call the crisis intervention number provided.

## 2018-03-19 NOTE — DISCHARGE NOTE BEHAVIORAL HEALTH - NSBHDCMEDSFT_PSY_A_CORE
MEDICATIONS  (STANDING):  buPROPion  milliGRAM(s) Oral daily    MEDICATIONS  (PRN)  traZODone 50 milliGRAM(s) Oral at bedtime PRN insomnia

## 2018-03-19 NOTE — DISCHARGE NOTE BEHAVIORAL HEALTH - FAMILY HISTORY OF PSYCHIATRIC ILLNESS
Single male lives with family, [place],  father from  and mother from [facility], was working 4 year for [employer]

## 2018-03-19 NOTE — DISCHARGE NOTE BEHAVIORAL HEALTH - HPI (INCLUDE ILLNESS QUALITY, SEVERITY, DURATION, TIMING, CONTEXT, MODIFYING FACTORS, ASSOCIATED SIGNS AND SYMPTOMS)
27 yohm, lives with family, works in sanitation, PPH depression, ADHD r/o Bipolar, Alcohol abuse in full remission, many years, Marijuana use in remission x1 month, multiple ED psych consults for depression and Mood dysregulation, no prior psych admissions, SA, self harm, or violence, PMH 15 lb weight loss secondary to Adderall rx, which pt stopped in Sept due to SE, and is allergic to fish, bib self for psych eval due to worsening depression.  Pt reports chronic progressing depression with increased symptoms of hopelessness, worthlessness, and SI for past 2 days.  Pt took recent Bentley for 3 months in lieu of resigning from San[employer], after he was encouraged to seek time off instead as employer did not want to lose Pt.  Pt has attempted treatment in past including Zoloft but stopped in 2 mo due to ineffectiveness.  Dr Saenz diagnosed with ADHD and was prescribed Adderall which was initially helpful, was able to focus and do a lot of reading, however stopped due to side effects and weight loss.  Pt reports increasing agitation, depressed mood, rumination, low enery and irritability with other and reports social anxiety at work.  Pt reports sleep is good, but broken and wakes during the night.  Pt is a Pt at [facility] for group therapy, but due to  pos urine tox for THC was advised to seek help elsewhere until he was in remission from drug use.  Pt was planning to return next month as he has stopped using MJ but depression and anxiety has increased and came to hospital for help including pos admission to 5[facility].   Pt reports past 2 days of SI with plan to hang self or cut his wrist.  Pt denies intent because of family.  In past ED evals, Pt has been irritable aggressive at home banging table, when he felt misunderstood by family.  Pt admits to limitations in frustration tolerance.  Pt has attempted other modalities for relaxation ie Yoga and is certified, however since depression and anxiety recurrence, so has social intolerance and once again is thinking of leaving his job, which he normally enjoys.

## 2018-03-19 NOTE — DISCHARGE NOTE BEHAVIORAL HEALTH - REASON FOR ADMISSION
· Reason For Referral  SI  · Patient's Chief Complaint  "I am not feeling well, My mood is low, I feel worthless, can't see my purpose, I have been having thoughts of suicide for 2 days... cutting my wrists or hanging, but I would never do it.".

## 2018-03-19 NOTE — DISCHARGE NOTE BEHAVIORAL HEALTH - CONDITIONS AT DISCHARGE
Pt mood shows improvement since admission to . Pt denies SI/HI. Pt denies A/V hallucinations. Pt was provided with instructions for all aftercare appointments. Pt also provided education on all prescribed meds. Pt verbalized understanding all information that was provided. Pt also verbalized intent to remain compliant with all treatment plans and prescribed medications.  All personal belongings were returned to pt. Pt mood shows improvement since admission to 5N. Pt denies SI/HI. Pt denies A/V hallucinations. Pt was provided with instructions for all aftercare appointments. Pt also provided education on all prescribed meds. Pt verbalized understanding all information that was provided. Pt also verbalized intent to remain compliant with all treatment plans and prescribed medications.  All personal belongings were returned to pt.  A copy of discharge plan given to patient.

## 2018-03-19 NOTE — DISCHARGE NOTE BEHAVIORAL HEALTH - NSBHDCALCOHOLREFERFT_PSY_A_CORE
Patient's substance abuse is in remission but if needed, he can continue tx of substances at [facility]

## 2018-03-19 NOTE — DISCHARGE NOTE BEHAVIORAL HEALTH - PAST PSYCHIATRIC HISTORY
Multiple ED evals Gabe Dias in [place] with group therapy.  Prescribed Zoloft in past cannot remember who prescribed.  Past treatment with Adderall which appeared to make him manic

## 2018-03-19 NOTE — DISCHARGE NOTE BEHAVIORAL HEALTH - NSBHDCDXVALIDYESFT_PSY_A_CORE
Patient was started in Wellbutrin Xl for depressive symptoms His mood improved although he reported some jittery feelings once Wellbutrin was decreased he interacted with selected peers.  He continued to have somewhat vague thought precess and was a bit guarded bu no osmel paranoia was evident.  Patient remained somewhat reluctant to take medication and he may possibly stop it upon discharge. Patient was started in Wellbutrin Xl for depressive symptoms His mood improved although he reported some jittery feelings once Wellbutrin was decreased he interacted with selected peers.  He continued to have somewhat vague thought precess and was a bit guarded bu no osmel paranoia was evident.  Patient remained somewhat reluctant to take medication and felt too anxious on therapeutic dose of 300 mg so it was lowered to 150 mg again prior to discharge with potential to titrate as an outpatient.

## 2018-03-19 NOTE — DISCHARGE NOTE BEHAVIORAL HEALTH - MEDICATION SUMMARY - MEDICATIONS TO STOP TAKING
I will STOP taking the medications listed below when I get home from the hospital:    buPROPion 300 mg/24 hours (XL) oral tablet, extended release  -- 1 tab(s) by mouth once a day until told to discontinue by MD    traZODone 50 mg oral tablet  -- 1 tab(s) by mouth once a day (at bedtime), As needed, insomnia until told to discontinue by MD

## 2018-03-19 NOTE — DISCHARGE NOTE BEHAVIORAL HEALTH - MEDICATION SUMMARY - MEDICATIONS TO TAKE
I will START or STAY ON the medications listed below when I get home from the hospital:    traZODone 50 mg oral tablet  -- 1 tab(s) by mouth once a day (at bedtime), As needed, insomnia until told to discontinue by MD  -- Indication: For insomnia    buPROPion 300 mg/24 hours (XL) oral tablet, extended release  -- 1 tab(s) by mouth once a day until told to discontinue by MD  -- Indication: For Depression I will START or STAY ON the medications listed below when I get home from the hospital:    traZODone 50 mg oral tablet  -- 1 tab(s) by mouth once a day (at bedtime), As needed, insomnia until told to discontinue by MD  -- Indication: For insomnia    Wellbutrin  mg/24 hours oral tablet, extended release  -- 1 tab(s) by mouth once a day until told to discontinue by MD  -- Check with your doctor before becoming pregnant.  Do not drink alcoholic beverages when taking this medication.  It is very important that you take or use this exactly as directed.  Do not skip doses or discontinue unless directed by your doctor.  Obtain medical advice before taking any non-prescription drugs as some may affect the action of this medication.  Swallow whole.  Do not crush.  This drug may impair the ability to drive or operate machinery.  Use care until you become familiar with its effects.    -- Indication: For Depression

## 2018-03-19 NOTE — DISCHARGE NOTE BEHAVIORAL HEALTH - NSBHDCADDR1FT_A_CORE
[address]  [place] [address] [address]  [place] [address]  Appointment with Marissa Skelton

## 2018-03-20 VITALS
HEART RATE: 55 BPM | OXYGEN SATURATION: 100 % | DIASTOLIC BLOOD PRESSURE: 69 MMHG | RESPIRATION RATE: 16 BRPM | SYSTOLIC BLOOD PRESSURE: 106 MMHG | TEMPERATURE: 97 F

## 2018-03-20 RX ORDER — BUPROPION HYDROCHLORIDE 150 MG/1
1 TABLET, EXTENDED RELEASE ORAL
Qty: 14 | Refills: 1 | OUTPATIENT
Start: 2018-03-20 | End: 2018-04-16

## 2018-03-20 RX ORDER — BUPROPION HYDROCHLORIDE 150 MG/1
1 TABLET, EXTENDED RELEASE ORAL
Qty: 14 | Refills: 1
Start: 2018-03-20 | End: 2018-04-16

## 2018-03-20 RX ORDER — BUPROPION HYDROCHLORIDE 150 MG/1
150 TABLET, EXTENDED RELEASE ORAL DAILY
Qty: 0 | Refills: 0 | Status: DISCONTINUED | OUTPATIENT
Start: 2018-03-20 | End: 2018-03-20

## 2018-03-20 RX ADMIN — BUPROPION HYDROCHLORIDE 150 MILLIGRAM(S): 150 TABLET, EXTENDED RELEASE ORAL at 09:36

## 2018-03-20 NOTE — PROGRESS NOTE BEHAVIORAL HEALTH - PROBLEM SELECTOR PLAN 1
Wellbutrin for depression
Start Wellbutrin for depression

## 2018-03-20 NOTE — PROGRESS NOTE BEHAVIORAL HEALTH - THOUGHT PROCESS
Normal reasoning/Linear
Linear/Normal reasoning

## 2018-03-20 NOTE — PROGRESS NOTE BEHAVIORAL HEALTH - SUMMARY
Patient is a 27 year old single male with a history of ADHD treatment and bipolar disorder and alcohol and MJ use disorders in remission (MJ remission x 1 mo).  Patient was in the ED multiple times in Dec. 2017  with  episode of agitation.  He reports progressive worsening depression,  with past 2 days of SI with pan to hang self or cuts writs but denies intent.  Pt is currently off all meds due to side effects ans is looking for a psychiatrist for medication, however due to acuity of depression felt he should be seek in ED for poss admission.  Pt was offered and agreed to psych admission to  on Voluntary status for treatment of mood dysregulation and safety.

## 2018-03-20 NOTE — PROGRESS NOTE BEHAVIORAL HEALTH - AXIS III
15 lb weight loss, Fish allergy (Iodine?)

## 2018-03-20 NOTE — PROGRESS NOTE BEHAVIORAL HEALTH - PROBLEM SELECTOR PLAN 2
Substance use groups on unit

## 2018-03-20 NOTE — PROGRESS NOTE BEHAVIORAL HEALTH - NSBHCHARTREVIEWVS_PSY_A_CORE FT
Vital Signs Last 24 Hrs  T(C): 36.2 (16 Mar 2018 07:39), Max: 37.2 (15 Mar 2018 17:25)  T(F): 97.1 (16 Mar 2018 07:39), Max: 98.9 (15 Mar 2018 17:25)  HR: 54 (16 Mar 2018 07:39) (54 - 80)  BP: 117/86 (16 Mar 2018 07:39) (117/86 - 125/75)  BP(mean): --  RR: 16 (16 Mar 2018 07:39) (16 - 17)  SpO2: 100% (16 Mar 2018 07:39) (100% - 100%)
Vital Signs Last 24 Hrs  T(C): 36.4 (18 Mar 2018 08:29), Max: 36.4 (18 Mar 2018 08:29)  T(F): 97.6 (18 Mar 2018 08:29), Max: 97.6 (18 Mar 2018 08:29)  HR: 50 (18 Mar 2018 08:29) (50 - 50)  BP: 118/74 (18 Mar 2018 08:29) (118/74 - 118/74)  BP(mean): --  RR: 12 (18 Mar 2018 08:29) (12 - 12)  SpO2: 100% (18 Mar 2018 08:29) (100% - 100%)
Vital Signs Last 24 Hrs  T(C): 36.3 (20 Mar 2018 07:58), Max: 36.3 (20 Mar 2018 07:58)  T(F): 97.4 (20 Mar 2018 07:58), Max: 97.4 (20 Mar 2018 07:58)  HR: 55 (20 Mar 2018 07:58) (55 - 55)  BP: 106/69 (20 Mar 2018 07:58) (106/69 - 106/69)  BP(mean): --  RR: 16 (20 Mar 2018 07:58) (16 - 16)  SpO2: 100% (20 Mar 2018 07:58) (100% - 100%)
Vital Signs Last 24 Hrs  T(C): 36.5 (19 Mar 2018 07:22), Max: 36.5 (19 Mar 2018 07:22)  T(F): 97.7 (19 Mar 2018 07:22), Max: 97.7 (19 Mar 2018 07:22)  HR: 95 (19 Mar 2018 07:22) (95 - 95)  BP: 123/82 (19 Mar 2018 07:22) (123/82 - 123/82)  BP(mean): --  RR: 16 (19 Mar 2018 07:22) (16 - 16)  SpO2: 99% (19 Mar 2018 07:22) (99% - 99%)
Vital Signs Last 24 Hrs  T(C): 36.7 (17 Mar 2018 08:38), Max: 36.7 (17 Mar 2018 08:38)  T(F): 98 (17 Mar 2018 08:38), Max: 98 (17 Mar 2018 08:38)  HR: 55 (17 Mar 2018 08:38) (55 - 55)  BP: 112/80 (17 Mar 2018 08:38) (112/80 - 112/80)  BP(mean): --  RR: 14 (17 Mar 2018 08:38) (14 - 14)  SpO2: 100% (17 Mar 2018 08:38) (100% - 100%)

## 2018-03-20 NOTE — PROGRESS NOTE BEHAVIORAL HEALTH - NSBHFUPINTERVALHXFT_PSY_A_CORE
Patient is improving   Mood is better but he is cautious as this is a controlled environment.  Also feels as if he needs to gain more weight as he had lost over 10 pounds on Adderall    MEDICATIONS  (STANDING):    MEDICATIONS  (PRN):  diphenhydrAMINE   Injectable 50 milliGRAM(s) IntraMuscular once PRN Agitation  haloperidol    Injectable 5 milliGRAM(s) IntraMuscular once PRN Agitation  LORazepam   Injectable 2 milliGRAM(s) IntraMuscular once PRN Agitation  traZODone 50 milliGRAM(s) Oral at bedtime PRN insomnia
Adjusting to unit milieu  Spending much time talking to female peer  Visited with family which went well  Realizes he is avoiding usual stressors here.    MEDICATIONS  (STANDING):  buPROPion  milliGRAM(s) Oral daily  traZODone 50 milliGRAM(s) Oral at bedtime    MEDICATIONS  (PRN):  diphenhydrAMINE   Injectable 50 milliGRAM(s) IntraMuscular once PRN Agitation  haloperidol    Injectable 5 milliGRAM(s) IntraMuscular once PRN Agitation  LORazepam   Injectable 2 milliGRAM(s) IntraMuscular once PRN Agitation
Patient is dysphoric at times but not suicidal  hoping to return to work by March 26    referrals for outpatient treatment in progress.
patient a bit more anxious today which he attributes to increase in medication but he would like to decrease dose and leave  Does not want to go for tx near where he works because he believes people talk about him  No osmel delusions    MEDICATIONS  (STANDING):  buPROPion  milliGRAM(s) Oral daily    MEDICATIONS  (PRN):  diphenhydrAMINE   Injectable 50 milliGRAM(s) IntraMuscular once PRN Agitation  haloperidol    Injectable 5 milliGRAM(s) IntraMuscular once PRN Agitation  LORazepam   Injectable 2 milliGRAM(s) IntraMuscular once PRN Agitation  traZODone 50 milliGRAM(s) Oral at bedtime PRN insomnia
patient adjusting to unit milieu    Not feeling suicidal  Attending groups  Plan is to start Wellbutrin for depression due potential beneficial effects on focus and decrease risk fo flipping into ortega    Reports preious hypomania was in context of Adderall use and withdrawal only

## 2018-03-20 NOTE — PROGRESS NOTE BEHAVIORAL HEALTH - PROBLEM SELECTOR PROBLEM 2
Cannabis use disorder, moderate, in controlled environment

## 2018-03-20 NOTE — PROGRESS NOTE BEHAVIORAL HEALTH - NSBHADMITIPOBSFT_PSY_A_CORE
no imminent risk to self

## 2018-03-20 NOTE — PROGRESS NOTE BEHAVIORAL HEALTH - PROBLEM SELECTOR PLAN 3
Substance use groups on unit  AA groups on unit.

## 2018-03-20 NOTE — PROGRESS NOTE BEHAVIORAL HEALTH - NSBHFUPINTERVALCCFT_PSY_A_CORE
Patient reports feeling more anxious on increased dose of Wellbutrin and did not take it this Am  Wanted to decrease dose back to 150 mg
Reports he is beginning to feel a bit couped up and thinks he may be ready to go home
patient reports feeling better since started on medication  Would not like to stay long
Ambivalent about starting medication but then said why eliel did I come here. I need help so I should take it
Reports he is doing well.  Concerned about stresses on the outside.  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

## 2018-03-20 NOTE — PROGRESS NOTE BEHAVIORAL HEALTH - PROBLEM SELECTOR PROBLEM 3
Alcohol use disorder

## 2018-03-22 DIAGNOSIS — F32.9 MAJOR DEPRESSIVE DISORDER, SINGLE EPISODE, UNSPECIFIED: ICD-10-CM

## 2018-03-22 DIAGNOSIS — F10.10 ALCOHOL ABUSE, UNCOMPLICATED: ICD-10-CM

## 2018-03-22 DIAGNOSIS — F90.9 ATTENTION-DEFICIT HYPERACTIVITY DISORDER, UNSPECIFIED TYPE: ICD-10-CM

## 2018-03-22 DIAGNOSIS — Z79.899 OTHER LONG TERM (CURRENT) DRUG THERAPY: ICD-10-CM

## 2018-03-22 DIAGNOSIS — Z91.013 ALLERGY TO SEAFOOD: ICD-10-CM

## 2018-03-22 DIAGNOSIS — F12.90 CANNABIS USE, UNSPECIFIED, UNCOMPLICATED: ICD-10-CM

## 2018-07-25 NOTE — PATIENT PROFILE BEHAVIORAL HEALTH - NS PRO TALK SOMEONE YN
Closure 3 Information: This tab is for additional flaps and grafts above and beyond our usual structured repairs.  Please note if you enter information here it will not currently bill and you will need to add the billing information manually. no

## 2019-05-01 NOTE — ED BEHAVIORAL HEALTH ASSESSMENT NOTE - AXIS IV
Subjective:       Patient ID: Monica Thao is a 34 y.o. female.    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  URI    This is a new problem. The current episode started in the past 7 days. The problem has been gradually worsening. There has been no fever. Associated symptoms include congestion, coughing (nonproductive), sneezing and a sore throat. Pertinent negatives include no chest pain, ear pain, headaches, nausea, plugged ear sensation, rhinorrhea, sinus pain, swollen glands or wheezing. Associated symptoms comments: +pnd, +shortness of breath. She has tried antihistamine (Benadryl, Coricidin, Mucinex) for the symptoms. The treatment provided mild relief.       Current Outpatient Medications   Medication Sig Dispense Refill    cetirizine (ZYRTEC) 10 MG tablet Take 1 tablet (10 mg total) by mouth once daily.  0    fluticasone propionate (FLONASE) 50 mcg/actuation nasal spray 2 sprays (100 mcg total) by Each Nare route once daily. 16 g 0     No current facility-administered medications for this visit.        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       Past Surgical History:   Procedure Laterality Date    CARDIAC VALVE SURGERY      HERNIA REPAIR      LUNG REMOVAL, TOTAL Left 1986    OVARIAN CYST REMOVAL         Family History   Problem Relation Age of Onset    Hypertension Mother     Asthma Father     No Known Problems Sister        Social History     Socioeconomic History    Marital status: Single     Spouse name: Not on file    Number of children: Not on file    Years of education: Not on file    Highest education level: Not on file   Occupational History    Not on file   Social Needs    Financial resource  "strain: Not on file    Food insecurity:     Worry: Not on file     Inability: Not on file    Transportation needs:     Medical: Not on file     Non-medical: Not on file   Tobacco Use    Smoking status: Never Smoker   Substance and Sexual Activity    Alcohol use: Yes     Comment: occasional    Drug use: No    Sexual activity: Not on file   Lifestyle    Physical activity:     Days per week: Not on file     Minutes per session: Not on file    Stress: Not on file   Relationships    Social connections:     Talks on phone: Not on file     Gets together: Not on file     Attends Synagogue service: Not on file     Active member of club or organization: Not on file     Attends meetings of clubs or organizations: Not on file     Relationship status: Not on file   Other Topics Concern    Not on file   Social History Narrative    Not on file       Review of Systems   Constitutional: Negative for chills, fever and unexpected weight change.   HENT: Positive for congestion, postnasal drip, sneezing and sore throat. Negative for ear pain, nosebleeds, rhinorrhea, sinus pressure, sinus pain, tinnitus and trouble swallowing.    Eyes: Negative for pain and itching.   Respiratory: Positive for cough (nonproductive) and shortness of breath. Negative for wheezing.    Cardiovascular: Negative for chest pain.   Gastrointestinal: Negative for nausea.   Musculoskeletal: Negative for arthralgias and myalgias.   Allergic/Immunologic: Positive for environmental allergies.   Neurological: Negative for headaches.   Hematological: Negative for adenopathy. Does not bruise/bleed easily.         Objective:     Vitals:    05/01/19 1507   BP: 124/82   Pulse: 89   Resp: 18   Temp: 98.7 °F (37.1 °C)   TempSrc: Oral   SpO2: 98%   Weight: 74.4 kg (164 lb)   Height: 5' 1" (1.549 m)          Physical Exam   Constitutional: She is oriented to person, place, and time. She appears well-developed and well-nourished. No distress.   HENT:   Head: " Normocephalic.   Right Ear: Tympanic membrane and ear canal normal.   Left Ear: Tympanic membrane and ear canal normal.   Nose: Mucosal edema and rhinorrhea present. Right sinus exhibits no maxillary sinus tenderness and no frontal sinus tenderness. Left sinus exhibits no maxillary sinus tenderness and no frontal sinus tenderness.   Mouth/Throat: Uvula is midline and mucous membranes are normal. Posterior oropharyngeal erythema (+pnd) present. No oropharyngeal exudate or posterior oropharyngeal edema.   Eyes: Pupils are equal, round, and reactive to light. Conjunctivae are normal.   Neck: Normal range of motion. Neck supple. No thyromegaly present.   Cardiovascular: Normal rate and regular rhythm.   Pulmonary/Chest: Effort normal and breath sounds normal. She has no wheezes. She has no rhonchi. She has no rales.   Musculoskeletal: Normal range of motion.   Lymphadenopathy:     She has no cervical adenopathy.   Neurological: She is alert and oriented to person, place, and time.   Skin: Skin is warm and dry.   Psychiatric: She has a normal mood and affect.         Assessment:         ICD-10-CM ICD-9-CM   1. Allergic rhinitis with postnasal drip J30.9 477.9    R09.82 784.91   2. Sore throat J02.9 462       Plan:       Allergic rhinitis with postnasal drip  -     fluticasone propionate (FLONASE) 50 mcg/actuation nasal spray; 2 sprays (100 mcg total) by Each Nare route once daily.  Dispense: 16 g; Refill: 0  -     cetirizine (ZYRTEC) 10 MG tablet; Take 1 tablet (10 mg total) by mouth once daily.; Refill: 0    Sore throat  -     POCT Rapid Strep A    Rapid strep A negative. Patient instructed to take Zyrtec and Flonase as directed. Recommend nasal lavage with neti pot or similar product; caution to follow product instructions carefully. Do not use tap water. Throat lozenges and warm salt water gargles as needed for sore throat.        Follow up if symptoms worsen or fail to improve.     Patient's Medications   New  Prescriptions    CETIRIZINE (ZYRTEC) 10 MG TABLET    Take 1 tablet (10 mg total) by mouth once daily.    FLUTICASONE PROPIONATE (FLONASE) 50 MCG/ACTUATION NASAL SPRAY    2 sprays (100 mcg total) by Each Nare route once daily.   Previous Medications    No medications on file   Modified Medications    No medications on file   Discontinued Medications    No medications on file      Problems with primary support/Occupational problems

## 2020-03-03 NOTE — ED ADULT NURSE NOTE - PRIMARY CARE PROVIDER
Subjective:       Patient ID: Marysol Lyles is a 82 y.o. female.    Chief Complaint: Urinary Tract Infection    Urinary Tract Infection    This is a new problem. The current episode started in the past 7 days. The problem occurs every urination. The problem has been unchanged. The quality of the pain is described as burning. The pain is mild. There has been no fever. Associated symptoms include frequency. Pertinent negatives include no behavior changes, chills, discharge, flank pain, hematuria, hesitancy, nausea, possible pregnancy, sweats, urgency, vomiting, weight loss, bubble bath use, constipation, rash or withholding. Associated symptoms comments: dysuria. She has tried nothing (urogesic blue) for the symptoms. The treatment provided no relief. Her past medical history is significant for hypertension and recurrent UTIs. There is no history of catheterization, diabetes insipidus, diabetes mellitus, genitourinary reflux, kidney stones, a single kidney, STD, urinary stasis or a urological procedure.     Past Medical History:   Diagnosis Date    Arthritis     Chronic pain 8/14/2013    DDD (degenerative disc disease)     DDD (degenerative disc disease)     GERD (gastroesophageal reflux disease)     Hyperlipidemia     Hypertension     Osteopenia 3/2/2016    Primary hyperparathyroidism     Senile cataract     OD     Social History     Socioeconomic History    Marital status:      Spouse name: Not on file    Number of children: Not on file    Years of education: Not on file    Highest education level: Not on file   Occupational History    Not on file   Social Needs    Financial resource strain: Not on file    Food insecurity:     Worry: Not on file     Inability: Not on file    Transportation needs:     Medical: Not on file     Non-medical: Not on file   Tobacco Use    Smoking status: Former Smoker    Smokeless tobacco: Never Used    Tobacco comment: Has not smoked in 40 years.   Substance and  Sexual Activity    Alcohol use: Yes     Comment: occasionally    Drug use: No    Sexual activity: Never     Birth control/protection: Post-menopausal   Lifestyle    Physical activity:     Days per week: Not on file     Minutes per session: Not on file    Stress: Not on file   Relationships    Social connections:     Talks on phone: Not on file     Gets together: Not on file     Attends Confucianist service: Not on file     Active member of club or organization: Not on file     Attends meetings of clubs or organizations: Not on file     Relationship status: Not on file   Other Topics Concern    Not on file   Social History Narrative    Wears a seatbelt.     Past Surgical History:   Procedure Laterality Date    APPENDECTOMY      CATARACT EXTRACTION W/  INTRAOCULAR LENS IMPLANT Left 05/16/2018    Dr Haywood    COLONOSCOPY W/ BIOPSIES  2/2011    repeat in 3 years    LUMBAR EPIDURAL INJECTION N/A 12/2019    Dr. Tejada     PARTIAL KNEE ARTHROPLASTY Right     Right    SLEEVE GASTROPLASTY  2/8/12    TONSILLECTOMY      TOTAL HIP ARTHROPLASTY Right     TOTAL KNEE ARTHROPLASTY Left        Review of Systems   Constitutional: Negative.  Negative for chills and weight loss.   HENT: Negative.    Eyes: Negative.    Respiratory: Negative.    Cardiovascular: Negative.    Gastrointestinal: Negative.  Negative for constipation, nausea and vomiting.   Endocrine: Negative.    Genitourinary: Positive for dysuria and frequency. Negative for flank pain, hematuria, hesitancy and urgency.   Musculoskeletal: Negative.    Skin: Negative.  Negative for rash.   Allergic/Immunologic: Negative.    Neurological: Negative.    Psychiatric/Behavioral: Negative.        Objective:      Physical Exam   Constitutional: She is oriented to person, place, and time. She appears well-developed and well-nourished.   HENT:   Head: Normocephalic.   Right Ear: External ear normal.   Left Ear: External ear normal.   Nose: Nose normal.    Mouth/Throat: Oropharynx is clear and moist.   Eyes: Pupils are equal, round, and reactive to light. Conjunctivae are normal.   Neck: Normal range of motion. Neck supple.   Cardiovascular: Normal rate, regular rhythm and normal heart sounds.   Pulmonary/Chest: Effort normal and breath sounds normal.   Abdominal: Soft. Bowel sounds are normal.   Musculoskeletal: Normal range of motion.   Neurological: She is alert and oriented to person, place, and time.   Skin: Skin is warm and dry. Capillary refill takes 2 to 3 seconds.   Psychiatric: She has a normal mood and affect. Her behavior is normal. Judgment and thought content normal.   Nursing note and vitals reviewed.      Assessment:       1. Urinary tract infection without hematuria, site unspecified    2. Dysuria        Plan:           Marysol was seen today for urinary tract infection.    Diagnoses and all orders for this visit:    Urinary tract infection without hematuria, site unspecified  Dysuria  -     Urinalysis  -     Urinalysis Microscopic  -     nitrofurantoin, macrocrystal-monohydrate, (MACROBID) 100 MG capsule; Take 1 capsule (100 mg total) by mouth 2 (two) times daily. for 10 days  Report to ER immediately if symptoms worsen         na

## 2020-03-10 ENCOUNTER — EMERGENCY (EMERGENCY)
Facility: HOSPITAL | Age: 30
LOS: 0 days | Discharge: HOME | End: 2020-03-10
Attending: EMERGENCY MEDICINE
Payer: COMMERCIAL

## 2020-03-10 VITALS
HEART RATE: 62 BPM | DIASTOLIC BLOOD PRESSURE: 77 MMHG | TEMPERATURE: 98 F | OXYGEN SATURATION: 100 % | RESPIRATION RATE: 18 BRPM | SYSTOLIC BLOOD PRESSURE: 145 MMHG

## 2020-03-10 VITALS — HEIGHT: 68 IN | WEIGHT: 139.99 LBS

## 2020-03-10 DIAGNOSIS — M54.5 LOW BACK PAIN: ICD-10-CM

## 2020-03-10 DIAGNOSIS — Z87.891 PERSONAL HISTORY OF NICOTINE DEPENDENCE: ICD-10-CM

## 2020-03-10 DIAGNOSIS — E86.0 DEHYDRATION: ICD-10-CM

## 2020-03-10 DIAGNOSIS — Z91.013 ALLERGY TO SEAFOOD: ICD-10-CM

## 2020-03-10 PROBLEM — F90.9 ATTENTION-DEFICIT HYPERACTIVITY DISORDER, UNSPECIFIED TYPE: Chronic | Status: ACTIVE | Noted: 2018-03-15

## 2020-03-10 PROBLEM — F31.9 BIPOLAR DISORDER, UNSPECIFIED: Chronic | Status: ACTIVE | Noted: 2017-12-07

## 2020-03-10 LAB
ALBUMIN SERPL ELPH-MCNC: 5.3 G/DL — HIGH (ref 3.3–5)
ALP SERPL-CCNC: 60 U/L — SIGNIFICANT CHANGE UP (ref 40–120)
ALT FLD-CCNC: 37 U/L — SIGNIFICANT CHANGE UP (ref 12–78)
ANION GAP SERPL CALC-SCNC: 11 MMOL/L — SIGNIFICANT CHANGE UP (ref 5–17)
APPEARANCE UR: CLEAR — SIGNIFICANT CHANGE UP
AST SERPL-CCNC: 29 U/L — SIGNIFICANT CHANGE UP (ref 15–37)
BASOPHILS # BLD AUTO: 0.09 K/UL — SIGNIFICANT CHANGE UP (ref 0–0.2)
BASOPHILS NFR BLD AUTO: 0.5 % — SIGNIFICANT CHANGE UP (ref 0–2)
BILIRUB SERPL-MCNC: 1.3 MG/DL — HIGH (ref 0.2–1.2)
BILIRUB UR-MCNC: NEGATIVE — SIGNIFICANT CHANGE UP
BUN SERPL-MCNC: 26 MG/DL — HIGH (ref 7–23)
CALCIUM SERPL-MCNC: 10.4 MG/DL — HIGH (ref 8.5–10.1)
CHLORIDE SERPL-SCNC: 104 MMOL/L — SIGNIFICANT CHANGE UP (ref 96–108)
CK SERPL-CCNC: 250 U/L — SIGNIFICANT CHANGE UP (ref 26–308)
CO2 SERPL-SCNC: 22 MMOL/L — SIGNIFICANT CHANGE UP (ref 22–31)
COLOR SPEC: YELLOW — SIGNIFICANT CHANGE UP
CREAT SERPL-MCNC: 1.34 MG/DL — HIGH (ref 0.5–1.3)
DIFF PNL FLD: ABNORMAL
EOSINOPHIL # BLD AUTO: 0.02 K/UL — SIGNIFICANT CHANGE UP (ref 0–0.5)
EOSINOPHIL NFR BLD AUTO: 0.1 % — SIGNIFICANT CHANGE UP (ref 0–6)
GLUCOSE SERPL-MCNC: 85 MG/DL — SIGNIFICANT CHANGE UP (ref 70–99)
GLUCOSE UR QL: NEGATIVE MG/DL — SIGNIFICANT CHANGE UP
HCT VFR BLD CALC: 46.8 % — SIGNIFICANT CHANGE UP (ref 39–50)
HGB BLD-MCNC: 16.3 G/DL — SIGNIFICANT CHANGE UP (ref 13–17)
IMM GRANULOCYTES NFR BLD AUTO: 0.6 % — SIGNIFICANT CHANGE UP (ref 0–1.5)
KETONES UR-MCNC: ABNORMAL
LEUKOCYTE ESTERASE UR-ACNC: ABNORMAL
LYMPHOCYTES # BLD AUTO: 14.7 % — SIGNIFICANT CHANGE UP (ref 13–44)
LYMPHOCYTES # BLD AUTO: 2.61 K/UL — SIGNIFICANT CHANGE UP (ref 1–3.3)
MAGNESIUM SERPL-MCNC: 2.2 MG/DL — SIGNIFICANT CHANGE UP (ref 1.6–2.6)
MCHC RBC-ENTMCNC: 30.4 PG — SIGNIFICANT CHANGE UP (ref 27–34)
MCHC RBC-ENTMCNC: 34.8 GM/DL — SIGNIFICANT CHANGE UP (ref 32–36)
MCV RBC AUTO: 87.2 FL — SIGNIFICANT CHANGE UP (ref 80–100)
MONOCYTES # BLD AUTO: 0.98 K/UL — HIGH (ref 0–0.9)
MONOCYTES NFR BLD AUTO: 5.5 % — SIGNIFICANT CHANGE UP (ref 2–14)
NEUTROPHILS # BLD AUTO: 13.97 K/UL — HIGH (ref 1.8–7.4)
NEUTROPHILS NFR BLD AUTO: 78.6 % — HIGH (ref 43–77)
NITRITE UR-MCNC: NEGATIVE — SIGNIFICANT CHANGE UP
PH UR: 5 — SIGNIFICANT CHANGE UP (ref 5–8)
PHOSPHATE SERPL-MCNC: 4.4 MG/DL — SIGNIFICANT CHANGE UP (ref 2.5–4.5)
PLATELET # BLD AUTO: 354 K/UL — SIGNIFICANT CHANGE UP (ref 150–400)
POTASSIUM SERPL-MCNC: 3.8 MMOL/L — SIGNIFICANT CHANGE UP (ref 3.5–5.3)
POTASSIUM SERPL-SCNC: 3.8 MMOL/L — SIGNIFICANT CHANGE UP (ref 3.5–5.3)
PROT SERPL-MCNC: 8.8 GM/DL — HIGH (ref 6–8.3)
PROT UR-MCNC: 30 MG/DL
RBC # BLD: 5.37 M/UL — SIGNIFICANT CHANGE UP (ref 4.2–5.8)
RBC # FLD: 12.6 % — SIGNIFICANT CHANGE UP (ref 10.3–14.5)
SODIUM SERPL-SCNC: 137 MMOL/L — SIGNIFICANT CHANGE UP (ref 135–145)
SP GR SPEC: 1.02 — SIGNIFICANT CHANGE UP (ref 1.01–1.02)
UROBILINOGEN FLD QL: NEGATIVE MG/DL — SIGNIFICANT CHANGE UP
WBC # BLD: 17.77 K/UL — HIGH (ref 3.8–10.5)
WBC # FLD AUTO: 17.77 K/UL — HIGH (ref 3.8–10.5)

## 2020-03-10 PROCEDURE — 96374 THER/PROPH/DIAG INJ IV PUSH: CPT

## 2020-03-10 PROCEDURE — 81001 URINALYSIS AUTO W/SCOPE: CPT

## 2020-03-10 PROCEDURE — 85025 COMPLETE CBC W/AUTO DIFF WBC: CPT

## 2020-03-10 PROCEDURE — 87086 URINE CULTURE/COLONY COUNT: CPT

## 2020-03-10 PROCEDURE — 36415 COLL VENOUS BLD VENIPUNCTURE: CPT

## 2020-03-10 PROCEDURE — 99284 EMERGENCY DEPT VISIT MOD MDM: CPT

## 2020-03-10 PROCEDURE — 82550 ASSAY OF CK (CPK): CPT

## 2020-03-10 PROCEDURE — 99284 EMERGENCY DEPT VISIT MOD MDM: CPT | Mod: 25

## 2020-03-10 PROCEDURE — 83735 ASSAY OF MAGNESIUM: CPT

## 2020-03-10 PROCEDURE — 87186 SC STD MICRODIL/AGAR DIL: CPT

## 2020-03-10 PROCEDURE — 84100 ASSAY OF PHOSPHORUS: CPT

## 2020-03-10 PROCEDURE — 80053 COMPREHEN METABOLIC PANEL: CPT

## 2020-03-10 RX ORDER — SODIUM CHLORIDE 9 MG/ML
1000 INJECTION INTRAMUSCULAR; INTRAVENOUS; SUBCUTANEOUS ONCE
Refills: 0 | Status: COMPLETED | OUTPATIENT
Start: 2020-03-10 | End: 2020-03-10

## 2020-03-10 RX ORDER — KETOROLAC TROMETHAMINE 30 MG/ML
30 SYRINGE (ML) INJECTION ONCE
Refills: 0 | Status: DISCONTINUED | OUTPATIENT
Start: 2020-03-10 | End: 2020-03-10

## 2020-03-10 RX ADMIN — Medication 30 MILLIGRAM(S): at 13:46

## 2020-03-10 RX ADMIN — SODIUM CHLORIDE 2000 MILLILITER(S): 9 INJECTION INTRAMUSCULAR; INTRAVENOUS; SUBCUTANEOUS at 14:02

## 2020-03-10 RX ADMIN — SODIUM CHLORIDE 2000 MILLILITER(S): 9 INJECTION INTRAMUSCULAR; INTRAVENOUS; SUBCUTANEOUS at 13:46

## 2020-03-10 NOTE — ED STATDOCS - PROGRESS NOTE DETAILS
30 y/o male with a PMHx of Anxiety, Depression, c/o severe lower back pain like a tightness sensation, then had muscle cramping to buttocks and, upper and lower extremity since 0830 this morning while at work. Hx of muscle spasms. Works in sanitation, does a lot of heavy lifting. Denies dysuria, hematuria. No recent foreign travel. No sick contact. Former smoker.   Dipika Tarango PA-C Pt. feeling much better after IVF and able to tolerate PO fluids and solids in ED.  Return precatuiosn presented and stressed importance of proper fluid intake.  Dipika Tarango PA-C

## 2020-03-10 NOTE — ED STATDOCS - PATIENT PORTAL LINK FT
You can access the FollowMyHealth Patient Portal offered by [facility] by registering at the following website: [email or web address]. By joining [facility]’s FollowMyHealth portal, you will also be able to view your health information using other applications (apps) compatible with our system.

## 2020-03-10 NOTE — ED STATDOCS - NSFOLLOWUPINSTRUCTIONS_ED_ALL_ED_FT
Dehydration    WHAT YOU NEED TO KNOW:    What is dehydration? Dehydration is a condition that develops when your body does not have enough fluid. You may become dehydrated if you do not drink enough water or lose too much fluid. Fluid loss may also cause loss of electrolytes (minerals), such as sodium.    What increases my risk for dehydration?     Vomiting, diarrhea, or fever      Being in the sun or heat for too long      Sweating while playing sports      Diseases, such as stroke, diabetes, or infections      Medicines that cause you to lose water and salt, such as diuretics (water pills)      Older age with decreased ability to sense thirst or to urinate    What are the signs and symptoms of dehydration?     Dry eyes or mouth      Increased thirst      Dark yellow urine      Urinating little or not at all      Tiredness or body weakness      Headache, dizziness, or confusion      Irregular or fast breathing, fast or pounding heartbeat, and low blood pressure      Sudden weight loss    How is dehydration diagnosed? Your healthcare provider will examine you and check your breathing and heartbeat. He or she will look at your eyes, skin, mouth, and tongue. He or she will ask you how much liquid you have been drinking, and how much you are urinating. Tell him or her if you have been vomiting or have diarrhea. Blood and urine tests are used to check your electrolyte levels. The tests may show the cause of your dehydration, such as infection or diabetes. They may also show if your kidneys are working correctly.    How is dehydration treated?     Oral liquids:   If you are mildly to moderately dehydrated, you may need an oral rehydration solution (ORS). This drink contains the right amount of salt, sugar, and minerals in water to replace body fluids. Ask your healthcare provider where you can get an ORS.       Drink an ORS in small amounts if you have been vomiting. If you vomit, wait 30 minutes and try again. Ask healthcare providers how much ORS you need when you are dehydrated and how often you should drink it.       A sports drink is not the same as an ORS. Do not drink sports drinks without asking your healthcare provider.      Do not drink soft drinks or fruit juices. These can make your condition worse.       You may receive fluid through an IV. Electrolytes may also be included in the fluid.      Hypodermoclysis gives your body a large amount of water quickly. The water is given into the deepest layer of your skin. Ask your healthcare provider for more information about hypodermoclysis.    What can I do to prevent dehydration?     Drink liquids as directed. Liquids that contain water, sugar, and minerals can help your body hold in fluid and help prevent dehydration. Drink liquids throughout the day, not just when you feel thirsty. Men should drink about 3 liters (13 eight-ounce cups) of liquid each day. Women should drink about 2 liters (9 eight-ounce cups) of liquid each day. Drink even more liquid if you will be outdoors, in the sun for a long time, or exercising.       Stay cool. Limit the time you spend outdoors during the hottest part of the day. Dress in lightweight clothes.       Keep track of how often you urinate. If you urinate less than usual or your urine is darker, drink more liquids.    When should I seek immediate care?     You have a seizure.      You are confused or cannot think clearly.      You are extremely sleepy, or another person cannot wake you.       You become dizzy or faint when you stand.      You are not able to urinate.      You have trouble breathing.      You have a fast or irregular heartbeat.      Your hands or feet are cold, or your face is pale.     When should I contact my healthcare provider?     You have trouble drinking liquids because you are vomiting.      Your symptoms get worse.      You have a fever.       You feel very weak or tired.      You have questions or concerns about your condition or care.    CARE AGREEMENT:    You have the right to help plan your care. Learn about your health condition and how it may be treated. Discuss treatment options with your healthcare providers to decide what care you want to receive. You always have the right to refuse treatment.

## 2020-03-10 NOTE — ED ADULT NURSE NOTE - OBJECTIVE STATEMENT
pt. c/o of spasms in hamstrings, buttocks, left bicep, right hip, lower back. pt. states he takes aleve and advil without relief. Pt. 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 Denies specific trauma. Pt. able to ambulate with steady gait.

## 2020-03-10 NOTE — ED ADULT NURSE NOTE - NSIMPLEMENTINTERV_GEN_ALL_ED
Implemented All Universal Safety Interventions:  [place] to call system. Call bell, personal items and telephone within reach. Instruct patient to call for assistance. Room bathroom lighting operational. Non-slip footwear when patient is off stretcher. Physically safe environment: no spills, clutter or unnecessary equipment. Stretcher in lowest position, wheels locked, appropriate side rails in place.

## 2020-03-10 NOTE — SBIRT NOTE ADULT - NSSBIRTBRIEFINTDET_GEN_A_CORE
Provided SBIRT services: Full screen positive. Brief Intervention Performed. Screening results were reviewed with the patient and patient was provided information about healthy guidelines and potential negative consequences associated with level of risk. Motivation and readiness to reduce or stop use was discussed and goals and activities to make changes were suggested/offered.  Offered the patient information on [employer] online platform.

## 2020-03-10 NOTE — ED ADULT TRIAGE NOTE - BMI (KG/M2)
----- Message from Carlo Jackson DO sent at 4/25/2017 11:55 AM CDT -----  Please call patient with an INR of 1.3 so we will have her take 10 mg of the Coumadin today and tomorrow and recheck a PT on Wednesday.    Q  
INR 1.3  Warfarin 10 mg today and tomorrow   Recheck PT on Thursday       Informed patient of the above     verbalized understanding     Lab appointment made for 4/27/17 at 12:10 pm     Updated medication list and anticoagulation flow sheet     Has no concerns   
21.3

## 2020-03-10 NOTE — ED ADULT TRIAGE NOTE - CHIEF COMPLAINT QUOTE
pt c/o severe b/l back, glutes, upper and lower extremity muscle cramping starting around 0830 this morning while pt was at work.

## 2020-03-10 NOTE — ED STATDOCS - CLINICAL SUMMARY MEDICAL DECISION MAKING FREE TEXT BOX
presents with muscle spasm, check labs, and Toradol. presents with muscle spasm, check labs, and Toradol.    Pt. feeling much better after IVF and able to tolerate PO fluids and solids in ED.  Return precatuiosn presented and stressed importance of proper fluid intake.  Dipika Tarango PA-C

## 2020-03-10 NOTE — ED STATDOCS - NSFOLLOWUPCLINICS_GEN_ALL_ED_FT
[facility]  Family Medicine  [address] [place] [address]  Phone: [phone]  Fax:   Follow Up Time:

## 2020-03-10 NOTE — ED STATDOCS - ATTENDING CONTRIBUTION TO CARE
I, Kathryn Arroyo MD,  performed the initial face to face bedside interview with this patient regarding history of present illness, review of symptoms and relevant past medical, social and family history.  I completed an independent physical examination.  I was the initial provider who evaluated this patient. I have signed out the follow up of any pending tests (i.e. labs, radiological studies) to the ACP.  I have communicated the patient’s plan of care and disposition with the ACP.  The history, relevant review of systems, past medical and surgical history, medical decision making, and physical examination was documented by the scribe in my presence and I attest to the accuracy of the documentation.

## 2020-03-10 NOTE — ED STATDOCS - OBJECTIVE STATEMENT
28 y/o male with a PMHx of Anxiety, Depression, c/o severe lower back pain like a tightness sensation, then had muscle cramping to buttocks and, upper and lower extremity since 0830 this morning while at work. Hx of muscle spasms. Works in sanitation, does a lot of heavy lifting. Denies dysuria, hematuria. No recent foreign travel. No sick contact. Former smoker.

## 2020-05-26 ENCOUNTER — EMERGENCY (EMERGENCY)
Facility: HOSPITAL | Age: 30
LOS: 0 days | Discharge: HOME | End: 2020-05-26
Attending: EMERGENCY MEDICINE
Payer: COMMERCIAL

## 2020-05-26 VITALS — WEIGHT: 134.92 LBS | HEIGHT: 67 IN

## 2020-05-26 VITALS
SYSTOLIC BLOOD PRESSURE: 136 MMHG | HEART RATE: 65 BPM | DIASTOLIC BLOOD PRESSURE: 84 MMHG | OXYGEN SATURATION: 100 % | RESPIRATION RATE: 18 BRPM | TEMPERATURE: 98 F

## 2020-05-26 DIAGNOSIS — Z91.013 ALLERGY TO SEAFOOD: ICD-10-CM

## 2020-05-26 DIAGNOSIS — M54.2 CERVICALGIA: ICD-10-CM
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
## 2020-05-26 DIAGNOSIS — S16.1XXA STRAIN OF MUSCLE, FASCIA AND TENDON AT NECK LEVEL, INITIAL ENCOUNTER: ICD-10-CM
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
## 2020-05-26 DIAGNOSIS — F31.9 BIPOLAR DISORDER, UNSPECIFIED: ICD-10-CM

## 2020-05-26 DIAGNOSIS — F90.9 ATTENTION-DEFICIT HYPERACTIVITY DISORDER, UNSPECIFIED TYPE: ICD-10-CM

## 2020-05-26 PROCEDURE — 99283 EMERGENCY DEPT VISIT LOW MDM: CPT

## 2020-05-26 PROCEDURE — 99284 EMERGENCY DEPT VISIT MOD MDM: CPT

## 2020-05-26 RX ORDER — CYCLOBENZAPRINE HYDROCHLORIDE 10 MG/1
1 TABLET, FILM COATED ORAL
Qty: 15 | Refills: 0
Start: 2020-05-26 | End: 2020-05-30

## 2020-05-26 RX ORDER — ACETAMINOPHEN 500 MG
1000 TABLET ORAL ONCE
Refills: 0 | Status: COMPLETED | OUTPATIENT
Start: 2020-05-26 | End: 2020-05-26

## 2020-05-26 RX ORDER — IBUPROFEN 200 MG
1 TABLET ORAL
Qty: 32 | Refills: 0
Start: 2020-05-26 | End: 2020-06-02

## 2020-05-26 RX ORDER — IBUPROFEN 200 MG
800 TABLET ORAL ONCE
Refills: 0 | Status: COMPLETED | OUTPATIENT
Start: 2020-05-26 | End: 2020-05-26

## 2020-05-26 RX ORDER — ACETAMINOPHEN 500 MG
2 TABLET ORAL
Qty: 32 | Refills: 0
Start: 2020-05-26 | End: 2020-05-29

## 2020-05-26 RX ORDER — LIDOCAINE 4 G/100G
1 CREAM TOPICAL ONCE
Refills: 0 | Status: COMPLETED | OUTPATIENT
Start: 2020-05-26 | End: 2020-05-26

## 2020-05-26 RX ADMIN — Medication 1000 MILLIGRAM(S): at 08:26

## 2020-05-26 RX ADMIN — Medication 800 MILLIGRAM(S): at 08:26

## 2020-05-26 RX ADMIN — LIDOCAINE 1 PATCH: 4 CREAM TOPICAL at 08:26

## 2020-05-26 NOTE — ED PROVIDER NOTE - NSFOLLOWUPINSTRUCTIONS_ED_ALL_ED_FT
FOLLOW UP WITH PMD  WITHIN 1-2DAYS, CALL TO MAKE APPOINTMENT  COME BACK TO ED IF YOUR CONDITION WORSENS OR IF YOU DEVELOP FEVER GREATER THAN 100.4F, CHEST PAIN,  SHORTNESS OF BREATH OR ANY OTHER SYMPTOMS CONCERNING TO YOU  TAKE TYLENOL (ACETAMINOPHEN) 650 MG EVERY 6 HOURS AS NEEDED FOR PAIN  TAKE IBUPROFEN (MOTRIN)  600 MG EVERY 6 HOURS AS NEEDED FOR PAIN  IF YOU WERE PRESRCIBED ANY MEDICATIONS FROM TODAY'S VISIT, TAKE THEM AS DIRECTED (flexeril)    Physiatrist & Pain management Specialist  Dr Molly Means  [address] [place] [address]  [phone]  [email or web address]    Cervical Strain    WHAT YOU NEED TO KNOW:    A cervical strain is a stretched or torn muscle or tendon in your neck. Tendons are strong tissues that connect muscles to bones. Common causes of cervical strains include a car accident, a fall, or a sports injury.     DISCHARGE INSTRUCTIONS:    Return to the emergency department if:     You have pain or numbness from your shoulder down to your hand.      You have problems with your vision, hearing, or balance.      You feel confused or cannot concentrate.      You have problems with movement and strength.    Contact your healthcare provider if:     You have increased swelling or pain in your neck.       You have questions or concerns about your condition or care.    Medicines: You may need any of the following:     Acetaminophen decreases pain and fever. It is available without a doctor's order. Ask how much to take and how often to take it. Follow directions. Read the labels of all other medicines you are using to see if they also contain acetaminophen, or ask your doctor or pharmacist. Acetaminophen can cause liver damage if not taken correctly. Do not use more than 4 grams (4,000 milligrams) total of acetaminophen in one day.       NSAIDs, such as ibuprofen, help decrease swelling, pain, and fever. This medicine is available with or without a doctor's order. NSAIDs can cause stomach bleeding or kidney problems in certain people. If you take blood thinner medicine, always ask your healthcare provider if NSAIDs are safe for you. Always read the medicine label and follow directions.      Muscle relaxers help decrease pain and muscle spasms.      Prescription pain medicine may be given. Ask your healthcare provider how to take this medicine safely. Some prescription pain medicines contain acetaminophen. Do not take other medicines that contain acetaminophen without talking to your healthcare provider. Too much acetaminophen may cause liver damage. Prescription pain medicine may cause constipation. Ask your healthcare provider how to prevent or treat constipation.       Take your medicine as directed. Contact your healthcare provider if you think your medicine is not helping or if you have side effects. Tell him or her if you are allergic to any medicine. Keep a list of the medicines, vitamins, and herbs you take. Include the amounts, and when and why you take them. Bring the list or the pill bottles to follow-up visits. Carry your medicine list with you in case of an emergency.    Manage your symptoms:     Apply heat on your neck for 15 to 20 minutes, 4 to 6 times a day or as directed. Heat helps decrease pain, stiffness, and muscle spasms.      Begin gentle neck exercises as soon as you can move your neck without pain. Exercises will help decrease stiffness and improve the strength and movement of your neck. Ask your healthcare provider what kind of exercises you should do.      Gradually return to your usual activities as directed. Stop if you have pain. Avoid activities that can cause more damage to your neck, such as heavy lifting or strenuous exercise.      Sleep without a pillow to help decrease pain. Instead, roll a small towel tightly and place it under your neck.       Go to physical therapy as directed. A physical therapist teaches you exercises to help improve movement and strength, and to decrease pain.     Prevent neck injury:     Drive safely. Make sure everyone in your car wears a seatbelt. A seatbelt can save your life if you are in an accident. Do not use your cell phone when you are driving. This could distract you and cause an accident. Pull over if you need to make a call or send a text message.       Wear helmets, lifejackets, and protective gear. Always wear a helmet when you ride a bike or motorcycle, go skiing, or play sports that could cause a head injury. Wear protective equipment when you play sports. Wear a lifejacket when you are on a boat or doing water sports.     Follow up with your healthcare provider as directed: You may be referred to an orthopedist or physical therapies. Write down your questions so you remember to ask them during your visits.

## 2020-05-26 NOTE — ED PROVIDER NOTE - PHYSICAL EXAMINATION
*GEN: No acute distress, well appearing   *HEAD: Normocephalic, Atraumatic  *EYES/NOSE: b/l Pupils symmetric & Reactive to ligth, EOMI b/l  *THROAT: airway patent, moist mucous membranes  *NECK: Neck supple  *PULMONARY: No Respiratory distress, symmetric b/l chest rise  *CARDIAC: s1s2, regular rhythm   *ABDOMEN:  Non Tender, Non Distended, soft, no guarding, no rebound, no masses   *BACK: no CVA tenderness, No midline vertebral tenderness to palpation, paracervical ttp around trapezius site  *EXTREMITIES: symmetric pulses, 2+ DP & radial pulses, no cyanosis, no edema   *SKIN: no rash, no bruising   *NEUROLOGIC: CN 2-12 intact, normal finger to nose & heel to shin; no dysdiadochokinesis; equal & normal strength & sensation in b/l UE/LE; full active & passive ROM in all extremeties,  no pronator drift, normal patellar reflex, normal gait, romberg sign negative   *PSYCH: appropriate concern about symptoms, pleasant

## 2020-05-26 NOTE — ED PROVIDER NOTE - NS ED ROS FT
Review of Systems:  	•	CONSTITUTIONAL: no fever  	•	SKIN: no rash  	•	RESPIRATORY: no shortness of breath  	•	CARDIAC: no chest pain  	•	GI:  no abd pain, no nausea, no vomiting, no diarrhea  	  	•	MUSCULOSKELETAL:  no back pain, neck pain  	•	NEUROLOGIC: no weakness  	  	•	PSYSCHIATRIC: appropriate concern about symptoms

## 2020-05-26 NOTE — ED PROVIDER NOTE - CLINICAL SUMMARY MEDICAL DECISION MAKING FREE TEXT BOX
paracervical ttp around trapezius site. normal neuro exam. likely cervical strain, will dc w/ pm&r f/u

## 2020-05-26 NOTE — ED PROVIDER NOTE - PATIENT PORTAL LINK FT
You can access the FollowMyHealth Patient Portal offered by [facility] by registering at the following website: [email or web address]. By joining [employer]’s FollowMyHealth portal, you will also be able to view your health information using other applications (apps) compatible with our system.

## 2020-05-26 NOTE — ED PROVIDER NOTE - OBJECTIVE STATEMENT
Pertinent HPI/PMH/PSH/FHx/SHx and Review of Systems contained within  HPI:  Patient p/w CC   neck pain x  4 days, new onset, intermittent. 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 pain is paracervical around b/l trapezius.   PMH/PSH relevant for: bipolar, no longer on any medications  ROS negative for: fever, Chest pain, SOB, Nausea, vomiting, diarrhea, abdominal pain, UE numbness/tingling, UE weakness  FamilyHx and SocialHx not otherwise contributory

## 2020-08-17 ENCOUNTER — EMERGENCY (EMERGENCY)
Facility: HOSPITAL | Age: 30
LOS: 0 days | Discharge: HOME | End: 2020-08-17
Attending: HOSPITALIST
Payer: COMMERCIAL

## 2020-08-17 VITALS
TEMPERATURE: 98 F | DIASTOLIC BLOOD PRESSURE: 88 MMHG | OXYGEN SATURATION: 100 % | SYSTOLIC BLOOD PRESSURE: 147 MMHG | RESPIRATION RATE: 18 BRPM | HEART RATE: 55 BPM

## 2020-08-17 VITALS — WEIGHT: 139.99 LBS

## 2020-08-17 DIAGNOSIS — F31.9 BIPOLAR DISORDER, UNSPECIFIED: ICD-10-CM

## 2020-08-17 DIAGNOSIS — F90.9 ATTENTION-DEFICIT HYPERACTIVITY DISORDER, UNSPECIFIED TYPE: ICD-10-CM

## 2020-08-17 DIAGNOSIS — S61.211A LACERATION WITHOUT FOREIGN BODY OF LEFT INDEX FINGER WITHOUT DAMAGE TO NAIL, INITIAL ENCOUNTER: ICD-10-CM

## 2020-08-17 DIAGNOSIS — Z91.013 ALLERGY TO SEAFOOD: ICD-10-CM
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
## 2020-08-17 DIAGNOSIS — S61.213A LACERATION WITHOUT FOREIGN BODY OF LEFT MIDDLE FINGER WITHOUT DAMAGE TO NAIL, INITIAL ENCOUNTER: ICD-10-CM
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
## 2020-08-17 PROCEDURE — 99283 EMERGENCY DEPT VISIT LOW MDM: CPT

## 2020-08-17 PROCEDURE — 99283 EMERGENCY DEPT VISIT LOW MDM: CPT | Mod: 25

## 2020-08-17 PROCEDURE — 12002 RPR S/N/AX/GEN/TRNK2.6-7.5CM: CPT

## 2020-08-17 RX ORDER — CEPHALEXIN 500 MG
1 CAPSULE ORAL
Qty: 14 | Refills: 0
Start: 2020-08-17 | End: 2020-08-23

## 2020-08-17 NOTE — ED ADULT TRIAGE NOTE - CHIEF COMPLAINT QUOTE
Pt c/p laceration to left hand specifically to left pointer and middle finger caused by grabbing a garbage  bag with a sharp object in it suspected to be a knife. bleeding cotnrolled

## 2020-08-17 NOTE — ED PROVIDER NOTE - CARE PROVIDER_API CALL
Beatrice Olguin  ORTHOPAEDIC SURGERY  [address] [place] [address]  Phone: [phone]  Fax: [phone]  Follow Up Time: 1-3 Days

## 2020-08-17 NOTE — ED ADULT NURSE NOTE - OBJECTIVE STATEMENT
pt presents to ed ambulatory for evaluation of laceration to left index and middle finger while grabbing bag with unknown sharp object. pt in no distress. vitals stable.bleeding controlled

## 2020-08-17 NOTE — ED PROVIDER NOTE - OBJECTIVE STATEMENT
30M p/w cuts to his left hand. patient is a  and thinks there was something sharp in the garbage he picked up this morning, cutting his 2nd and 3rd finger on his left hand.  no limitations to movement. came to ED for eval. tdap utd.

## 2020-08-17 NOTE — ED PROVIDER NOTE - NSFOLLOWUPINSTRUCTIONS_ED_ALL_ED_FT
keep area clean and dry.  please let steri-strips fall off  do not scrub area  please follow up with Dr. Olguin in 1-3 days. contact info provided.  please take antibiotics as prescribed

## 2020-08-17 NOTE — ED PROVIDER NOTE - PATIENT PORTAL LINK FT
You can access the FollowMyHealth Patient Portal offered by [facility] by registering at the following website: [email or web address]. By joining [employer]’s FollowMyHealth portal, you will also be able to view your health information using other applications (apps) compatible with our system.

## 2020-10-29 NOTE — ED ADULT NURSE NOTE - TEMPLATE LIST FOR HEAD TO TOE ASSESSMENT
Patient : Janna Nicolas Age: 57 year old Sex: female   MRN: [id number] Encounter Date: 10/28/2020      History     Chief Complaint   Patient presents with   • Dizziness     This is a 57-year-old female with history of hypertension presenting with complaint of near syncope which brought her to the emergency department followed by syncope in triage.  Patient states she has felt lightheaded since yesterday and it has worsened since then. Denies any recent fevers, chills, cp, headache, change in vision, shortness of breath, leg swelling or other complaints. Syncope was witnessed while in triage so patient brought back to main ED bed. She did not hit her head or fall to the ground.    PCP: Dr. Abarca          No Known Allergies    There are no discharge medications for this patient.      Past Medical History:   Diagnosis Date   • Essential (primary) hypertension        No past surgical history on file.    No family history on file.    Social History     Tobacco Use   • Smoking status: Not on file   Substance Use Topics   • Alcohol use: Not on file   • Drug use: Not on file       Review of Systems   Constitutional: Negative for chills, fatigue and fever.   HENT: Negative for facial swelling and sore throat.    Eyes: Negative for photophobia and pain.   Respiratory: Negative for cough and shortness of breath.    Cardiovascular: Negative for chest pain and leg swelling.   Gastrointestinal: Negative for abdominal pain, diarrhea, nausea and vomiting.   Genitourinary: Negative for dysuria and hematuria.   Musculoskeletal: Negative for neck stiffness.   Skin: Negative for rash.   Neurological: Positive for dizziness, syncope and light-headedness. Negative for headaches.   All other systems reviewed and are negative.      Physical Exam     ED Triage Vitals [10/28/20 1819]   ED Triage Vitals Group      Temp 98.2 °F (36.8 °C)      Heart Rate (!) 114      Resp 16      BP (!) 149/95      SpO2 97 %      EtCO2 mmHg       Height        Weight 160 lb (72.6 kg)      Weight Scale Used       BMI (Calculated)       IBW/kg (Calculated)        Physical Exam  Vitals signs and nursing note reviewed.   HENT:      Head: Normocephalic and atraumatic.      Nose: Nose normal.      Mouth/Throat:      Mouth: Mucous membranes are moist.   Eyes:      Conjunctiva/sclera: Conjunctivae normal.   Neck:      Musculoskeletal: Neck supple. No neck rigidity.   Cardiovascular:      Rate and Rhythm: Normal rate and regular rhythm.      Pulses: Normal pulses.   Pulmonary:      Effort: Pulmonary effort is normal.      Breath sounds: Normal breath sounds.   Abdominal:      General: There is no distension.      Palpations: Abdomen is soft.   Musculoskeletal: Normal range of motion.   Skin:     General: Skin is warm and dry.      Capillary Refill: Capillary refill takes less than 2 seconds.   Neurological:      General: No focal deficit present.      Mental Status: She is alert and oriented to person, place, and time.      Cranial Nerves: No cranial nerve deficit.      Sensory: No sensory deficit.      Motor: No weakness.      Coordination: Coordination normal.   Psychiatric:         Mood and Affect: Mood normal.         ED Course     Procedures    Lab Results     Results for orders placed or performed during the hospital encounter of 10/28/20   CBC with Automated Differential   Result Value Ref Range    WBC 8.8 4.2 - 11.0 K/mcL    RBC 4.09 4.00 - 5.20 mil/mcL    HGB 12.3 12.0 - 15.5 g/dL    HCT 36.1 36.0 - 46.5 %    MCV 88.3 78.0 - 100.0 fl    MCH 30.1 26.0 - 34.0 pg    MCHC 34.1 32.0 - 36.5 g/dL    RDW-CV 13.4 11.0 - 15.0 %     140 - 450 K/mcL    NRBC 0 0 /100 WBC    DIFF TYPE AUTOMATED DIFFERENTIAL     Neutrophil 64 %    LYMPH 23 %    MONO 9 %    EOSIN 3 %    BASO 0 %    Percent Immature Granuloctyes 1 %    Absolute Neutrophil 5.6 1.8 - 7.7 K/mcL    Absolute Lymph 2.1 1.0 - 4.0 K/mcL    Absolute Mono 0.8 0.3 - 0.9 K/mcL    Absolute Eos 0.3 0.1 - 0.5 K/mcL    Absolute  Baso 0.0 0.0 - 0.3 K/mcL    Absolute Immature Granulocytes 0.0 0 - 0.2 K/mcl   Metered blood glucose   Result Value Ref Range    Glucose Bedside  (H) 70 - 99 mg/dL       EKG Results     EKG Interpretation  Rate: 72  Rhythm: NSR, QT prolongation, nonspecific ST segment changes.    EKG tracing interpreted by ED physician    Radiology Results     Imaging Results          XR CHEST PA AND LATERAL 2 VIEWS (Final result)  Result time 10/28/20 19:08:10    Final result                 Impression:    1.   NO ACUTE CARDIOPULMONARY PROCESS       Electronically Signed by: NERI JUAREZ MD   Signed on: 10/28/2020 7:08 PM                Narrative:    EXAM:  XR CHEST PA AND LATERAL 2 VIEWS  PROVIDED CLINICAL INFORMATION/INDICATION FOR EXAM:  Chest Pain.  TECHNIQUE:  XR CHEST PA AND LATERAL 2 VIEWS  COMPARISON:  None available    FINDINGS:   LUNGS:  The lungs are clear of segmental infiltrate, focal opacity or congestion  No pulmonary nodules  No pleural effusions   Hair artifact projects over the right upper lobe  Slight elevation the right hemidiaphragm  Pneumothorax/pneumomediastinum: None  HEART: The heart is normal in size.     MEDIASTINUM: The mediastinal contour is within normal limits..   Prominent aortic arch  BONES:     No concerning bony abnormality.  No acute bony fractures  No acute displaced rib fractures identified   Dextrocurvature  UPPER ABDOMEN:   No evidence of free air or bowel obstruction                                   ED Medication Orders (From admission, onward)    None               [facility]    2110---When patient was initially brought back after syncope in waiting room, she was moaning and asking what happened. At that time, she was hypotensive and bradycardic to 40s. However, after 2-3 minutes, she became alert and oriented and only complained of light headedness. At this time, neuro intact.    2229---On re-exam, patient states she feels much better.  States she is a little lightheaded still however  Orthopedic she is completely neurologically intact and vital signs are within normal limits.  She has not had any further episodes of syncope.     2246---Discussed with Dr. Murphy. Accepts admission. Would like cardiology on consult and would like nurse to record orthostatic vitals.     Clinical Impression     ED Diagnosis   1. Syncope, unspecified syncope type         Disposition        Admit 10/28/2020 10:49 PM  Telemetry Bed?: Yes  Patient Class: Inpatient [1]  Level of Care: Acute [1]  Admission Diagnosis: Syncope [385985]  Admitting Physician: SEBASTIAN MURPHY [[id number]]  Is this a telephone or verbal order?: This is a telephone order from the admitting physician                     Sera Gan MD  10/28/20 9804

## 2020-12-14 NOTE — ED BEHAVIORAL HEALTH ASSESSMENT NOTE - MEDICATIONS (PRESCRIPTIONS, DIRECTIONS)
Reason for Call:  Other prescription    Detailed comments: Patient states it is due to start tomorrow but has the day off to  script please instruct pharmacy she can  today.    Phone Number Patient can be reached at: Home number on file [phone] (home)    Best Time:     Can we leave a detailed message on this number? YES    Call taken on 12/14/2020 at 9:52 AM by Evelyne Morales       None

## 2021-04-09 NOTE — ED ADULT NURSE NOTE - PAIN: PRESENCE, MLM
[FreeTextEntry1] : 66 y/o F never smoker w/hx of HLD, HTN, DVTs and varicose veins presents for initial evaluation of LLE swelling.On exam, BLE mild swelling from below the knee to the feet. Strong peripheral pulses throughout b/l LEs. No evidence of skin breakdown, no palpable cords Feet are pink, toes are warm to touch with adequate capillary refill. \par \par Plan: \par Skin intact well perfused, no vascular interventions are recommended at this time. Pt recommended she wear compression stockings daily, leg raise as much as possible to reduce swelling and stay active with daily walking. She will f/u with us here PRN.  [Arterial/Venous Disease] : arterial/venous disease denies pain/discomfort

## 2021-04-15 NOTE — ED PROVIDER NOTE - NEUROLOGICAL, MLM
Problem: Potential for Falls  Goal: Patient will remain free of falls  Description: INTERVENTIONS:  - Assess patient frequently for physical needs  -  Identify cognitive and physical deficits and behaviors that affect risk of falls    -  Dixie fall precautions as indicated by assessment   - Educate patient/family on patient safety including physical limitations  - Instruct patient to call for assistance with activity based on assessment  - Modify environment to reduce risk of injury  - Consider OT/PT consult to assist with strengthening/mobility  Outcome: Progressing Alert and oriented, no focal deficits, no motor or sensory deficits.

## 2021-08-12 ENCOUNTER — EMERGENCY (EMERGENCY)
Facility: HOSPITAL | Age: 31
LOS: 0 days | Discharge: HOME | End: 2021-08-12
Attending: EMERGENCY MEDICINE
Payer: COMMERCIAL

## 2021-08-12 VITALS
HEART RATE: 102 BPM | RESPIRATION RATE: 17 BRPM | OXYGEN SATURATION: 96 % | SYSTOLIC BLOOD PRESSURE: 128 MMHG | TEMPERATURE: 98 F | DIASTOLIC BLOOD PRESSURE: 98 MMHG

## 2021-08-12 VITALS — HEIGHT: 78 IN | WEIGHT: 139.99 LBS

## 2021-08-12 DIAGNOSIS — Z91.013 ALLERGY TO SEAFOOD: ICD-10-CM

## 2021-08-12 DIAGNOSIS — F31.9 BIPOLAR DISORDER, UNSPECIFIED: ICD-10-CM

## 2021-08-12 DIAGNOSIS — K40.90 UNILATERAL INGUINAL HERNIA, WITHOUT OBSTRUCTION OR GANGRENE, NOT SPECIFIED AS RECURRENT: ICD-10-CM

## 2021-08-12 DIAGNOSIS — F90.9 ATTENTION-DEFICIT HYPERACTIVITY DISORDER, UNSPECIFIED TYPE: ICD-10-CM

## 2021-08-12 DIAGNOSIS — R10.2 PELVIC AND PERINEAL PAIN: ICD-10-CM

## 2021-08-12 PROCEDURE — 99282 EMERGENCY DEPT VISIT SF MDM: CPT

## 2021-08-12 PROCEDURE — 99284 EMERGENCY DEPT VISIT MOD MDM: CPT

## 2021-08-12 NOTE — ED PROVIDER NOTE - CARE PROVIDER_API CALL
Margarette Gaytan (DO)  Surgery  [address], [address] [place] [address]  Phone: [phone]  Fax: [phone]  Follow Up Time: 1-3 Days

## 2021-08-12 NOTE — ED ADULT TRIAGE NOTE - CHIEF COMPLAINT QUOTE
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 noting a bulge in area that popped".

## 2021-08-12 NOTE — ED ADULT NURSE NOTE - NSIMPLEMENTINTERV_GEN_ALL_ED
Implemented All Universal Safety Interventions:  [place] to call system. Call bell, personal items and telephone within reach. Instruct patient to call for assistance. Room bathroom lighting operational. Non-slip footwear when patient is off stretcher. Physically safe environment: no spills, clutter or unnecessary equipment. Stretcher in lowest position, wheels locked, appropriate side rails in place.

## 2021-08-12 NOTE — ED PROVIDER NOTE - CLINICAL SUMMARY MEDICAL DECISION MAKING FREE TEXT BOX
Pt with story and exam c/w inguinal hernia.  No suggestion of incarceration or strangulation.  Reduced easily at bedside.  NO gu symptoms and normal gu exam.  Not suspicious for UTI.  Stable for outpatient surgery f/u.  D/c home with strict return precautions and prompt outpatient f/u.

## 2021-08-12 NOTE — ED PROVIDER NOTE - PHYSICAL EXAMINATION
Constitutional: NAD, well appearing  HEENT: no rhinorrhea  CVS:  RRR  Resp:  CTAB  GI: soft, ntnd, hernia present to L inguinal region, no overlying skin changes, reduces with ease  : no ttp to testicles bilaterally.  MSK:  no restriction to rom, full ROM to all extremities  Neuro:  A&Ox3  Skin: no rash  psych: clear thought content  Heme/lymph:  No LAD

## 2021-08-12 NOTE — ED PROVIDER NOTE - OBJECTIVE STATEMENT
31 y M no sig pmh presenting with bulge to left inguinal region after lifting heavy item at work.  had initial pain, but has subsided.  NO dysuria.  No testicular pain.  No n/v.  Passing gas.  Normal stooling.  No prior abd surgeries.

## 2021-08-12 NOTE — ED PROVIDER NOTE - PATIENT PORTAL LINK FT
You can access the FollowMyHealth Patient Portal offered by [facility] by registering at the following website: [email or web address]. By joining [employer]’s FollowMyHealth portal, you will also be able to view your health information using other applications (apps) compatible with our system.

## 2021-08-12 NOTE — ED PROVIDER NOTE - NSICDXPASTMEDICALHX_GEN_ALL_CORE_FT
PAST MEDICAL HISTORY:  ADHD     Bipolar affective disorder, current episode manic, current episode severity unspecified

## 2021-08-12 NOTE — ED PROVIDER NOTE - NSICDXFAMILYHX_GEN_ALL_CORE_FT
FAMILY HISTORY:  Father  Still living? Yes, Estimated age: Age Unknown  Family history of bipolar disorder, Age at diagnosis: Age Unknown

## 2021-08-12 NOTE — ED PROVIDER NOTE - NSFOLLOWUPINSTRUCTIONS_ED_ALL_ED_FT
Inguinal Hernia    WHAT YOU NEED TO KNOW:    An inguinal hernia happens when organs or abdominal tissue push through a weak spot in the abdominal wall. The abdominal wall is made of fat and muscle. It holds the intestines in place. The hernia may contain fluid, tissue from the abdomen, or part of an organ (such as an intestine).    Inguinal Hernia         DISCHARGE INSTRUCTIONS:    Return to the emergency department if:   •You have severe abdominal pain with nausea and vomiting.       •Your abdomen is larger than usual.       •Your hernia gets bigger or is purple or blue.       •You see blood in your bowel movements.      •You feel weak, dizzy, or faint.       Contact your healthcare provider if:   •You have a fever.      •You have questions or concerns about your condition or care.      Medicine: You may need the following:   •NSAIDs, such as ibuprofen, help decrease swelling, pain, and fever. NSAIDs can cause stomach bleeding or kidney problems in certain people. If you take blood thinner medicine, always ask your healthcare provider if NSAIDs are safe for you. Always read the medicine label and follow directions.      •Take your medicine as directed. Contact your healthcare provider if you think your medicine is not helping or if you have side effects. Tell him or her if you are allergic to any medicine. Keep a list of the medicines, vitamins, and herbs you take. Include the amounts, and when and why you take them. Bring the list or the pill bottles to follow-up visits. Carry your medicine list with you in case of an emergency.      Follow up with your healthcare provider as directed: Write down your questions so you remember to ask them during your visits.    Manage your symptoms and prevent another hernia:   •Do not lift anything heavy. Heavy lifting can make your hernia worse or cause another hernia. Ask your healthcare provider how much is safe for you to lift.       •Drink liquids as directed. Liquids may prevent constipation and straining during a bowel movement. Ask how much liquid to drink each day and which liquids are best for you.       •Eat foods high in fiber. Fiber may prevent constipation and straining during a bowel movement. Foods that contain fiber include fruits, vegetables, beans, lentils, and whole grains.       •Maintain a healthy weight. If you are overweight, weight loss may prevent your hernia from getting worse. It may also prevent another hernia. Talk to your healthcare provider about exercise and how to lose weight safely if you are overweight.       •Do not smoke. Nicotine and other chemicals in cigarettes and cigars can weaken the abdominal wall. This may increase your risk for another hernia. Ask your healthcare provider for information if you currently smoke and need help to quit. E-cigarettes or smokeless tobacco still contain nicotine. Talk to your healthcare provider before you use these products.

## 2021-08-16 ENCOUNTER — APPOINTMENT (OUTPATIENT)
Dept: SURGERY | Facility: CLINIC | Age: 31
End: 2021-08-16
Payer: COMMERCIAL

## 2021-08-16 VITALS
SYSTOLIC BLOOD PRESSURE: 114 MMHG | DIASTOLIC BLOOD PRESSURE: 66 MMHG | HEIGHT: 66 IN | BODY MASS INDEX: 21.69 KG/M2 | HEART RATE: 93 BPM | WEIGHT: 135 LBS

## 2021-08-16 PROCEDURE — 99241 OFFICE CONSULTATION NEW/ESTAB PATIENT 15 MIN: CPT

## 2021-08-16 NOTE — PHYSICAL EXAM
[JVD] : no jugular venous distention  [Normal Breath Sounds] : Normal breath sounds [Normal Heart Sounds] : normal heart sounds [No Rash or Lesion] : No rash or lesion [Alert] : alert [Oriented to Person] : oriented to person [Oriented to Place] : oriented to place [Oriented to Time] : oriented to time [de-identified] : no distress [de-identified] : NCAT [de-identified] : bowel sounds (+), soft, nontender, non distended, no rebound, no guarding, no rigidity, no skin changes to exam. Negative aguiar's sign to exam\par Small left inguinal fascial defect/reducible hernia appreciated with valsalva, no right inguinal or b/l femoral hernias to exam; no skin changes, no tenderness [de-identified] : no edema [de-identified] : normal affect

## 2021-08-16 NOTE — HISTORY OF PRESENT ILLNESS
[de-identified] : Pt seen and examined. Pt is AAOx3, pt in no acute distress. Pt denied c/o fever, chills, chest pain, SOB, abd pain, N/V/D, extremity pain or dysfunction, hemoptysis, hematemesis, hematuria, hematochexia, headache, diplopia, vertigo, dizzyness. Pt tolerating diet, (+) void, (+) ambulation, (+) bowel function\par \par Pt has c/o left inguinal hernia that has been "bothering" him for last several weeks. Pt states buldging is reducible and non tender, but wants to have surgery for repair. 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

## 2021-08-16 NOTE — ASSESSMENT
[FreeTextEntry1] : A/P:\par Left non incarcerated non strangulated inguinal hernia\par Pt offered/advised surgical intervention for hernia repair; pt wants to be evaluated for Laparoscopic hernia repair, referal to Dr Huma veliz\par Pt advised to seek immediate medical attention for worsening pain, hernia that does not reduce, any adverse changs to health\par Pt advised to avoid heavy lifting/bending/strenuous activities

## 2021-08-18 ENCOUNTER — APPOINTMENT (OUTPATIENT)
Dept: SURGERY | Facility: CLINIC | Age: 31
End: 2021-08-18
Payer: COMMERCIAL

## 2021-08-18 PROCEDURE — 99243 OFF/OP CNSLTJ NEW/EST LOW 30: CPT

## 2021-08-18 NOTE — PHYSICAL EXAM
[Abdomen Tenderness] : ~T ~M No abdominal tenderness [de-identified] : reducible left inguinal hernia

## 2021-09-15 ENCOUNTER — APPOINTMENT (OUTPATIENT)
Dept: SURGERY | Facility: CLINIC | Age: 31
End: 2021-09-15
Payer: COMMERCIAL

## 2021-09-15 ENCOUNTER — NON-APPOINTMENT (OUTPATIENT)
Age: 31
End: 2021-09-15

## 2021-09-15 DIAGNOSIS — K40.90 UNILATERAL INGUINAL HERNIA, W/OUT OBSTRUCTION OR GANGRENE, NOT SPECIFIED AS RECURRENT: ICD-10-CM

## 2021-09-15 DIAGNOSIS — K42.9 UMBILICAL HERNIA W/OUT OBSTRUCTION OR GANGRENE: ICD-10-CM

## 2021-09-15 PROCEDURE — 99213 OFFICE O/P EST LOW 20 MIN: CPT

## 2021-09-15 NOTE — PHYSICAL EXAM
[Abdomen Tenderness] : ~T ~M No abdominal tenderness [de-identified] : NAD [de-identified] : reducible umbilical hernia noted, non distended.

## 2021-09-15 NOTE — HISTORY OF PRESENT ILLNESS
[de-identified] : c/o umbilical tenderness along with the left inguinal region from lifting at work.\par -N/V

## 2021-10-18 ENCOUNTER — APPOINTMENT (OUTPATIENT)
Dept: DISASTER EMERGENCY | Facility: CLINIC | Age: 31
End: 2021-10-18

## 2021-10-18 DIAGNOSIS — Z01.818 ENCOUNTER FOR OTHER PREPROCEDURAL EXAMINATION: ICD-10-CM

## 2021-10-19 LAB — SARS-COV-2 N GENE NPH QL NAA+PROBE: NOT DETECTED

## 2021-10-21 ENCOUNTER — OUTPATIENT (OUTPATIENT)
Dept: INPATIENT UNIT | Facility: HOSPITAL | Age: 31
LOS: 1 days | Discharge: HOME | End: 2021-10-21
Payer: COMMERCIAL

## 2021-10-21 VITALS
TEMPERATURE: 98 F | OXYGEN SATURATION: 100 % | WEIGHT: 139.99 LBS | DIASTOLIC BLOOD PRESSURE: 80 MMHG | HEART RATE: 50 BPM | HEIGHT: 67 IN | SYSTOLIC BLOOD PRESSURE: 133 MMHG | RESPIRATION RATE: 16 BRPM

## 2021-10-21 VITALS
TEMPERATURE: 98 F | DIASTOLIC BLOOD PRESSURE: 80 MMHG | RESPIRATION RATE: 14 BRPM | HEART RATE: 61 BPM | OXYGEN SATURATION: 98 % | SYSTOLIC BLOOD PRESSURE: 129 MMHG

## 2021-10-21 DIAGNOSIS — D17.1 BENIGN LIPOMATOUS NEOPLASM OF SKIN AND SUBCUTANEOUS TISSUE OF TRUNK: ICD-10-CM

## 2021-10-21 DIAGNOSIS — K40.90 UNILATERAL INGUINAL HERNIA, WITHOUT OBSTRUCTION OR GANGRENE, NOT SPECIFIED AS RECURRENT: ICD-10-CM

## 2021-10-21 LAB
ANION GAP SERPL CALC-SCNC: 6 MMOL/L — SIGNIFICANT CHANGE UP (ref 5–17)
APPEARANCE UR: CLEAR — SIGNIFICANT CHANGE UP
APTT BLD: 30.4 SEC — SIGNIFICANT CHANGE UP (ref 27.5–35.5)
BILIRUB UR-MCNC: NEGATIVE — SIGNIFICANT CHANGE UP
BUN SERPL-MCNC: 10 MG/DL — SIGNIFICANT CHANGE UP (ref 7–23)
CALCIUM SERPL-MCNC: 8.8 MG/DL — SIGNIFICANT CHANGE UP (ref 8.5–10.1)
CHLORIDE SERPL-SCNC: 107 MMOL/L — SIGNIFICANT CHANGE UP (ref 96–108)
CO2 SERPL-SCNC: 28 MMOL/L — SIGNIFICANT CHANGE UP (ref 22–31)
COLOR SPEC: YELLOW — SIGNIFICANT CHANGE UP
CREAT SERPL-MCNC: 0.91 MG/DL — SIGNIFICANT CHANGE UP (ref 0.5–1.3)
DIFF PNL FLD: NEGATIVE — SIGNIFICANT CHANGE UP
GLUCOSE SERPL-MCNC: 97 MG/DL — SIGNIFICANT CHANGE UP (ref 70–99)
GLUCOSE UR QL: NEGATIVE MG/DL — SIGNIFICANT CHANGE UP
HCT VFR BLD CALC: 41.7 % — SIGNIFICANT CHANGE UP (ref 39–50)
HGB BLD-MCNC: 13.9 G/DL — SIGNIFICANT CHANGE UP (ref 13–17)
INR BLD: 1.1 RATIO — SIGNIFICANT CHANGE UP (ref 0.88–1.16)
KETONES UR-MCNC: NEGATIVE — SIGNIFICANT CHANGE UP
LEUKOCYTE ESTERASE UR-ACNC: NEGATIVE — SIGNIFICANT CHANGE UP
MCHC RBC-ENTMCNC: 29.8 PG — SIGNIFICANT CHANGE UP (ref 27–34)
MCHC RBC-ENTMCNC: 33.3 GM/DL — SIGNIFICANT CHANGE UP (ref 32–36)
MCV RBC AUTO: 89.3 FL — SIGNIFICANT CHANGE UP (ref 80–100)
NITRITE UR-MCNC: NEGATIVE — SIGNIFICANT CHANGE UP
PH UR: 6.5 — SIGNIFICANT CHANGE UP (ref 5–8)
PLATELET # BLD AUTO: 326 K/UL — SIGNIFICANT CHANGE UP (ref 150–400)
POTASSIUM SERPL-MCNC: 4.2 MMOL/L — SIGNIFICANT CHANGE UP (ref 3.5–5.3)
POTASSIUM SERPL-SCNC: 4.2 MMOL/L — SIGNIFICANT CHANGE UP (ref 3.5–5.3)
PROT UR-MCNC: NEGATIVE MG/DL — SIGNIFICANT CHANGE UP
PROTHROM AB SERPL-ACNC: 12.8 SEC — SIGNIFICANT CHANGE UP (ref 10.6–13.6)
RBC # BLD: 4.67 M/UL — SIGNIFICANT CHANGE UP (ref 4.2–5.8)
RBC # FLD: 12.6 % — SIGNIFICANT CHANGE UP (ref 10.3–14.5)
SODIUM SERPL-SCNC: 141 MMOL/L — SIGNIFICANT CHANGE UP (ref 135–145)
SP GR SPEC: 1.01 — SIGNIFICANT CHANGE UP (ref 1.01–1.02)
UROBILINOGEN FLD QL: NEGATIVE MG/DL — SIGNIFICANT CHANGE UP
WBC # BLD: 6.34 K/UL — SIGNIFICANT CHANGE UP (ref 3.8–10.5)
WBC # FLD AUTO: 6.34 K/UL — SIGNIFICANT CHANGE UP (ref 3.8–10.5)

## 2021-10-21 PROCEDURE — C1889: CPT

## 2021-10-21 PROCEDURE — 49585: CPT

## 2021-10-21 PROCEDURE — 36415 COLL VENOUS BLD VENIPUNCTURE: CPT

## 2021-10-21 PROCEDURE — 85610 PROTHROMBIN TIME: CPT

## 2021-10-21 PROCEDURE — 81003 URINALYSIS AUTO W/O SCOPE: CPT

## 2021-10-21 PROCEDURE — 80048 BASIC METABOLIC PNL TOTAL CA: CPT

## 2021-10-21 PROCEDURE — 93005 ELECTROCARDIOGRAM TRACING: CPT

## 2021-10-21 PROCEDURE — 85027 COMPLETE CBC AUTOMATED: CPT

## 2021-10-21 PROCEDURE — 93010 ELECTROCARDIOGRAM REPORT: CPT

## 2021-10-21 PROCEDURE — C9399: CPT

## 2021-10-21 PROCEDURE — 85730 THROMBOPLASTIN TIME PARTIAL: CPT

## 2021-10-21 PROCEDURE — C1781: CPT

## 2021-10-21 PROCEDURE — 49650 LAP ING HERNIA REPAIR INIT: CPT

## 2021-10-21 RX ORDER — OXYCODONE HYDROCHLORIDE 5 MG/1
1 TABLET ORAL
Qty: 20 | Refills: 0
Start: 2021-10-21 | End: 2021-10-25

## 2021-10-21 RX ORDER — OXYCODONE HYDROCHLORIDE 5 MG/1
5 TABLET ORAL ONCE
Refills: 0 | Status: DISCONTINUED | OUTPATIENT
Start: 2021-10-21 | End: 2021-10-21

## 2021-10-21 RX ORDER — OXYCODONE HYDROCHLORIDE 5 MG/1
10 TABLET ORAL ONCE
Refills: 0 | Status: DISCONTINUED | OUTPATIENT
Start: 2021-10-21 | End: 2021-10-21

## 2021-10-21 RX ORDER — SODIUM CHLORIDE 9 MG/ML
1000 INJECTION, SOLUTION INTRAVENOUS
Refills: 0 | Status: DISCONTINUED | OUTPATIENT
Start: 2021-10-21 | End: 2021-10-21

## 2021-10-21 RX ORDER — FENTANYL CITRATE 50 UG/ML
50 INJECTION INTRAVENOUS
Refills: 0 | Status: DISCONTINUED | OUTPATIENT
Start: 2021-10-21 | End: 2021-10-21

## 2021-10-21 RX ORDER — ONDANSETRON 8 MG/1
4 TABLET, FILM COATED ORAL ONCE
Refills: 0 | Status: DISCONTINUED | OUTPATIENT
Start: 2021-10-21 | End: 2021-10-21

## 2021-10-21 RX ADMIN — FENTANYL CITRATE 50 MICROGRAM(S): 50 INJECTION INTRAVENOUS at 15:31

## 2021-10-21 RX ADMIN — OXYCODONE HYDROCHLORIDE 10 MILLIGRAM(S): 5 TABLET ORAL at 15:16

## 2021-10-21 RX ADMIN — FENTANYL CITRATE 50 MICROGRAM(S): 50 INJECTION INTRAVENOUS at 15:18

## 2021-10-21 RX ADMIN — SODIUM CHLORIDE 75 MILLILITER(S): 9 INJECTION, SOLUTION INTRAVENOUS at 15:18

## 2021-10-21 RX ADMIN — FENTANYL CITRATE 50 MICROGRAM(S): 50 INJECTION INTRAVENOUS at 15:16

## 2021-10-21 RX ADMIN — OXYCODONE HYDROCHLORIDE 10 MILLIGRAM(S): 5 TABLET ORAL at 15:18

## 2021-10-21 NOTE — BRIEF OPERATIVE NOTE - NSICDXBRIEFPOSTOP_GEN_ALL_CORE_FT
POST-OP DIAGNOSIS:  Umbilical hernia 21-Oct-2021 15:07:19  Salazar William  Left inguinal hernia 21-Oct-2021 15:07:36  Salazar William

## 2021-10-21 NOTE — BRIEF OPERATIVE NOTE - NSICDXBRIEFPROCEDURE_GEN_ALL_CORE_FT
PROCEDURES:  Repair, hernia, umbilical, adult 21-Oct-2021 15:06:21  Salazar William  Laparoscopic inguinal herniorrhaphy 21-Oct-2021 15:06:39  Salazar William

## 2021-10-21 NOTE — BRIEF OPERATIVE NOTE - NSICDXBRIEFPREOP_GEN_ALL_CORE_FT
PRE-OP DIAGNOSIS:  Umbilical hernia 21-Oct-2021 15:06:57  Salazar William  Hernia, inguinal, left 21-Oct-2021 15:07:08  Salazar William

## 2021-10-21 NOTE — ASU DISCHARGE PLAN (ADULT/PEDIATRIC) - CARE PROVIDER_API CALL
Yuri Finn (DO)  Surgery; Surgical Critical Care  [address] [facility], [address]st [place] [address]  Phone: [phone]  Fax: [phone]  Established Patient  Follow Up Time: 1 week

## 2021-10-21 NOTE — ASU DISCHARGE PLAN (ADULT/PEDIATRIC) - CALL YOUR DOCTOR IF YOU HAVE ANY OF THE FOLLOWING:
Bleeding that does not stop/Swelling that gets worse/Pain not relieved by Medications/Fever greater than (need to indicate Fahrenheit or Celsius)/Wound/Surgical Site with redness, or foul smelling discharge or pus/Numbness, tingling, color or temperature change to extremity/Nausea and vomiting that does not stop 7

## 2021-10-27 ENCOUNTER — APPOINTMENT (OUTPATIENT)
Dept: SURGERY | Facility: CLINIC | Age: 31
End: 2021-10-27
Payer: OTHER MISCELLANEOUS

## 2021-10-27 DIAGNOSIS — Z87.898 PERSONAL HISTORY OF OTHER SPECIFIED CONDITIONS: ICD-10-CM

## 2021-10-27 PROCEDURE — 99024 POSTOP FOLLOW-UP VISIT: CPT

## 2021-10-27 NOTE — REASON FOR VISIT
[Follow-Up] : a follow-up visit [FreeTextEntry1] : s/p left inguinal hernia repair and umbilical hernia repair doing well.

## 2021-10-27 NOTE — PHYSICAL EXAM
[Abdomen Tenderness] : ~T ~M No abdominal tenderness [de-identified] : NAD [de-identified] : incision sites clear healing well.

## 2021-10-28 DIAGNOSIS — K40.90 UNILATERAL INGUINAL HERNIA, WITHOUT OBSTRUCTION OR GANGRENE, NOT SPECIFIED AS RECURRENT: ICD-10-CM

## 2021-10-28 DIAGNOSIS — F41.9 ANXIETY DISORDER, UNSPECIFIED: ICD-10-CM

## 2021-10-28 DIAGNOSIS — Z91.013 ALLERGY TO SEAFOOD: ICD-10-CM

## 2021-10-28 DIAGNOSIS — K42.9 UMBILICAL HERNIA WITHOUT OBSTRUCTION OR GANGRENE: ICD-10-CM

## 2021-10-28 DIAGNOSIS — F90.9 ATTENTION-DEFICIT HYPERACTIVITY DISORDER, UNSPECIFIED TYPE: ICD-10-CM

## 2021-11-24 ENCOUNTER — APPOINTMENT (OUTPATIENT)
Dept: SURGERY | Facility: CLINIC | Age: 31
End: 2021-11-24

## 2021-12-08 ENCOUNTER — APPOINTMENT (OUTPATIENT)
Dept: SURGERY | Facility: CLINIC | Age: 31
End: 2021-12-08
Payer: COMMERCIAL

## 2021-12-08 DIAGNOSIS — Z87.898 PERSONAL HISTORY OF OTHER SPECIFIED CONDITIONS: ICD-10-CM

## 2021-12-08 PROCEDURE — 99024 POSTOP FOLLOW-UP VISIT: CPT

## 2023-06-12 NOTE — ED ADULT NURSE NOTE - NSHOSCREENINGQ1_ED_ALL_ED
Family Medicine Progress Note    Patient:  Jennifer Pierson  YOB: 1990    MRN: [id number]     Acct: [id number]     Admit date: 6/10/2023    Patient Seen, Chart, Consults notes, Labs, Radiology studies reviewed.    Subjective: Day 0 of stay with nontraumatic rhabdomyolysis to underlying glycogen-storage disease and most recent (in last 24 hours) has had adequate pain control.  Slept well overnight.  Had 1 brief episode of nausea no vomiting.    Past, Family, Social History unchanged from admission.    Diet: Diet: Regular/House Diet; Texture: Regular Texture (IDDSI 7); Fluid Consistency: Thin (IDDSI 0)    Medications:  Scheduled Meds:amLODIPine, 10 mg, Oral, Daily  baclofen, 10 mg, Oral, TID  busPIRone, 15 mg, Oral, Q12H  enoxaparin, 60 mg, Subcutaneous, Q12H  escitalopram, 20 mg, Oral, Daily  famotidine, 20 mg, Oral, BID  losartan, 25 mg, Oral, Daily  pregabalin, 100 mg, Oral, TID  QUEtiapine, 25 mg, Oral, Nightly  sodium bicarbonate, 650 mg, Oral, Daily      Continuous Infusions:HYDROmorphone HCl-NaCl,   Pharmacy Consult,   sodium chloride, 200 mL/hr, Last Rate: 200 mL/hr (06/12/23 0244)      PRN Meds:  clonazePAM    naloxone    Pharmacy Consult    sodium chloride    SUMAtriptan    tiZANidine    Objective:    Lab Results (last 24 hours)       Procedure Component Value Units Date/Time    Basic Metabolic Panel [[id number]]  (Normal) Collected: 06/12/23 0435    Specimen: Blood Updated: 06/12/23 0624     Glucose 95 mg/dL      BUN 11 mg/dL      Creatinine 0.80 mg/dL      Sodium 143 mmol/L      Potassium 3.9 mmol/L      Comment: Slight hemolysis detected by analyzer. Results may be affected.        Chloride 107 mmol/L      CO2 25.0 mmol/L      Calcium 9.2 mg/dL      BUN/Creatinine Ratio 13.8     Anion Gap 11.0 mmol/L      eGFR 100.5 mL/min/1.73     Narrative:      GFR Normal >60  Chronic Kidney Disease <60  Kidney Failure <15      CK [[id number]]  (Abnormal) Collected: 06/12/23 0435    Specimen: Blood  "Updated: 06/12/23 0624     Creatine Kinase 1,838 U/L              Imaging Results (Last 72 Hours)       ** No results found for the last 72 hours. **             Physical Exam:    Vitals: /86 (BP Location: Right arm, Patient Position: Lying)   Pulse 62   Temp 97.5 °F (36.4 °C) (Oral)   Resp 16   Ht 160 cm (63\")   Wt 128 kg (282 lb 9.6 oz)   LMP 06/09/2023   SpO2 99%   BMI 50.06 kg/m²   24 hour intake/output:  Intake/Output Summary (Last 24 hours) at 6/12/2023 0716  Last data filed at 6/12/2023 0600  Gross per 24 hour   Intake 3219.9 ml   Output 3050 ml   Net 169.9 ml     Last 3 weights:  Wt Readings from Last 3 Encounters:   06/11/23 128 kg (282 lb 9.6 oz)   06/09/23 125 kg (276 lb)   05/31/23 122 kg (270 lb)       General Appearance alert, appears stated age, cooperative, and morbidly obese  Head normocephalic, without obvious abnormality and atraumatic  Eyes lids and lashes normal, conjunctivae and sclerae normal, no icterus, and no pallor  Neck supple and trachea midline  Lungs clear to auscultation, respirations regular, respirations even, and respirations unlabored  Heart regular rhythm & normal rate, normal S1, S2, and no murmur, no gallop, no rub  Abdomen normal bowel sounds, soft non-tender, no guarding, and no rebound tenderness  Extremities moves extremities well, no edema, no cyanosis, and no redness  Skin turgor normal  Neurologic Mental Status orientated to person, place, time and situation        Assessment:      Non-traumatic rhabdomyolysis    Depression    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  Continue PCA for the next 24 hours with plan to discontinue tomorrow morning and place on as needed medications.  Following CK level which is trending down.      Electronically signed by Richy Espinoza MD on 6/12/2023 at 07:16 CDT            " No

## 2023-09-01 ENCOUNTER — EMERGENCY (EMERGENCY)
Facility: HOSPITAL | Age: 33
LOS: 0 days | Discharge: HOME | End: 2023-09-01
Attending: EMERGENCY MEDICINE
Payer: COMMERCIAL

## 2023-09-01 VITALS
OXYGEN SATURATION: 100 % | TEMPERATURE: 97 F | RESPIRATION RATE: 18 BRPM | DIASTOLIC BLOOD PRESSURE: 92 MMHG | SYSTOLIC BLOOD PRESSURE: 125 MMHG | HEART RATE: 65 BPM

## 2023-09-01 VITALS — WEIGHT: 147.05 LBS | HEIGHT: 67 IN

## 2023-09-01 DIAGNOSIS — S39.012A STRAIN OF MUSCLE, FASCIA AND TENDON OF LOWER BACK, INITIAL ENCOUNTER: ICD-10-CM

## 2023-09-01 DIAGNOSIS — F31.9 BIPOLAR DISORDER, UNSPECIFIED: ICD-10-CM
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
## 2023-09-01 DIAGNOSIS — M54.50 LOW BACK PAIN, UNSPECIFIED: ICD-10-CM
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
## 2023-09-01 DIAGNOSIS — Z91.013 ALLERGY TO SEAFOOD: ICD-10-CM

## 2023-09-01 DIAGNOSIS — F90.9 ATTENTION-DEFICIT HYPERACTIVITY DISORDER, UNSPECIFIED TYPE: ICD-10-CM

## 2023-09-01 DIAGNOSIS — R10.12 LEFT UPPER QUADRANT PAIN: ICD-10-CM

## 2023-09-01 PROCEDURE — 74176 CT ABD & PELVIS W/O CONTRAST: CPT | Mod: 26,MA

## 2023-09-01 PROCEDURE — 99284 EMERGENCY DEPT VISIT MOD MDM: CPT

## 2023-09-01 PROCEDURE — 74176 CT ABD & PELVIS W/O CONTRAST: CPT | Mod: MA

## 2023-09-01 PROCEDURE — 99053 MED SERV 10PM-8AM 24 HR FAC: CPT

## 2023-09-01 PROCEDURE — 99284 EMERGENCY DEPT VISIT MOD MDM: CPT | Mod: 25

## 2023-09-01 RX ORDER — ACETAMINOPHEN 500 MG
1000 TABLET ORAL ONCE
Refills: 0 | Status: COMPLETED | OUTPATIENT
Start: 2023-09-01 | End: 2023-09-01

## 2023-09-01 RX ADMIN — Medication 1000 MILLIGRAM(S): at 10:26

## 2023-09-01 NOTE — ED ADULT NURSE NOTE - NSFALLRISKINTERV_ED_ALL_ED
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
## 2023-09-01 NOTE — ED PROVIDER NOTE - MUSCULOSKELETAL, MLM
Spine appears normal, range of motion is not limited, mild ttp left lower flank. Pt able to ambulate without assistance.

## 2023-09-01 NOTE — ED PROVIDER NOTE - PATIENT PORTAL LINK FT
You can access the FollowMyHealth Patient Portal offered by [facility] by registering at the following website: [email or web address]. By joining [employer]’s FollowMyHealth portal, you will also be able to view your health information using other applications (apps) compatible with our system.
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
## 2023-09-01 NOTE — ED PROVIDER NOTE - PROGRESS NOTE DETAILS
Attending Arroyo,  patient updated on results of tests.  Patient is able to ambulate in no acute distress.  Plan DC
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
## 2024-07-15 NOTE — ED ADULT TRIAGE NOTE - CHIEF COMPLAINT QUOTE
neck pain
75 minutes spent on total encounter; more than 50% of the visit was spent counseling and / or coordinating care by the attending physician.  The necessity of the time spent during the encounter on this date of service was due to: Coordination of care.

## 2024-09-04 NOTE — ED BEHAVIORAL HEALTH ASSESSMENT NOTE - TIME CONSULT PERFORMED
[FreeTextEntry1] : 1- check urine  2- CT urogram  3- CYSTO after ( brochure  given) 4- flomax for luts _ risk and beneftis discyssed 5- discussed adding proscar if still wiht sxs if prostate vol > 40  09-Dec-2017 04:38

## 2024-12-20 NOTE — ED BEHAVIORAL HEALTH ASSESSMENT NOTE - NS ED BHA MSE GENERAL APPEARANCE
Hpi Title: Evaluation of Skin Lesions
Additional History: Est pt\\n4 month fbse\\nHx of mild DN\\nNo spots
Well developed

## 2025-05-22 NOTE — SBIRT NOTE ADULT - NSSBIRTNALOXDUR_GEN_A_CORE
15
I have multiple complaints but I am here because I see blood in my urine. hx of kidney stone but I have no pain this time. patient also has ear pain and oral pain, has schedule for cardiac ablation in june